# Patient Record
Sex: MALE | Race: WHITE | NOT HISPANIC OR LATINO | Employment: FULL TIME
[De-identification: names, ages, dates, MRNs, and addresses within clinical notes are randomized per-mention and may not be internally consistent; named-entity substitution may affect disease eponyms.]

---

## 2017-08-14 ENCOUNTER — RECORDS - HEALTHEAST (OUTPATIENT)
Dept: ADMINISTRATIVE | Facility: OTHER | Age: 42
End: 2017-08-14

## 2018-08-20 ENCOUNTER — RECORDS - HEALTHEAST (OUTPATIENT)
Dept: LAB | Facility: CLINIC | Age: 43
End: 2018-08-20

## 2018-08-20 LAB
ALBUMIN SERPL-MCNC: 4.6 G/DL (ref 3.5–5)
ALP SERPL-CCNC: 45 U/L (ref 45–120)
ALT SERPL W P-5'-P-CCNC: 74 U/L (ref 0–45)
AST SERPL W P-5'-P-CCNC: 75 U/L (ref 0–40)
BILIRUB DIRECT SERPL-MCNC: 0.2 MG/DL
BILIRUB SERPL-MCNC: 0.6 MG/DL (ref 0–1)
PROT SERPL-MCNC: 7.9 G/DL (ref 6–8)

## 2018-08-21 LAB — HCV AB SERPL QL IA: NEGATIVE

## 2018-08-22 LAB
HBV CORE AB SERPL QL IA: NEGATIVE
HEPATITIS B SURFACE ANTIBODY LHE- HISTORICAL: NEGATIVE

## 2022-01-01 ENCOUNTER — TELEPHONE (OUTPATIENT)
Dept: BEHAVIORAL HEALTH | Facility: CLINIC | Age: 47
End: 2022-01-01

## 2022-01-01 ENCOUNTER — HOSPITAL ENCOUNTER (EMERGENCY)
Facility: HOSPITAL | Age: 47
Discharge: PSYCHIATRIC OR SUBSTANCE USE FACILITY | End: 2022-10-14
Attending: EMERGENCY MEDICINE | Admitting: EMERGENCY MEDICINE
Payer: COMMERCIAL

## 2022-01-01 ENCOUNTER — HOSPITAL ENCOUNTER (INPATIENT)
Facility: CLINIC | Age: 47
LOS: 3 days | Discharge: HOME | DRG: 885 | End: 2022-10-17
Attending: PSYCHIATRY & NEUROLOGY | Admitting: PSYCHIATRY & NEUROLOGY
Payer: COMMERCIAL

## 2022-01-01 VITALS
HEART RATE: 69 BPM | OXYGEN SATURATION: 99 % | SYSTOLIC BLOOD PRESSURE: 131 MMHG | HEIGHT: 72 IN | DIASTOLIC BLOOD PRESSURE: 77 MMHG | BODY MASS INDEX: 27.09 KG/M2 | WEIGHT: 200 LBS | TEMPERATURE: 99.7 F | RESPIRATION RATE: 16 BRPM

## 2022-01-01 VITALS
TEMPERATURE: 97.8 F | SYSTOLIC BLOOD PRESSURE: 139 MMHG | WEIGHT: 181 LBS | DIASTOLIC BLOOD PRESSURE: 87 MMHG | HEIGHT: 72 IN | BODY MASS INDEX: 24.52 KG/M2 | HEART RATE: 99 BPM | RESPIRATION RATE: 18 BRPM | OXYGEN SATURATION: 97 %

## 2022-01-01 DIAGNOSIS — J45.20 MILD INTERMITTENT ASTHMA WITHOUT COMPLICATION: Primary | ICD-10-CM

## 2022-01-01 DIAGNOSIS — R45.851 SUICIDAL IDEATION: ICD-10-CM

## 2022-01-01 DIAGNOSIS — F32.1 MAJOR DEPRESSIVE DISORDER, SINGLE EPISODE, MODERATE (H): ICD-10-CM

## 2022-01-01 DIAGNOSIS — Z91.09 ENVIRONMENTAL ALLERGIES: ICD-10-CM

## 2022-01-01 DIAGNOSIS — F10.10 ALCOHOL ABUSE: ICD-10-CM

## 2022-01-01 DIAGNOSIS — F41.1 GAD (GENERALIZED ANXIETY DISORDER): ICD-10-CM

## 2022-01-01 LAB
ALBUMIN SERPL-MCNC: 2.6 G/DL (ref 3.4–5)
ALP SERPL-CCNC: 123 U/L (ref 40–150)
ALT SERPL W P-5'-P-CCNC: 59 U/L (ref 0–70)
AMPHETAMINES UR QL SCN: NORMAL
ANION GAP SERPL CALCULATED.3IONS-SCNC: 10 MMOL/L (ref 7–15)
ANION GAP SERPL CALCULATED.3IONS-SCNC: 7 MMOL/L (ref 3–14)
APAP SERPL-MCNC: <5 UG/ML (ref 10–30)
AST SERPL W P-5'-P-CCNC: 149 U/L (ref 0–45)
BARBITURATES UR QL SCN: NORMAL
BASOPHILS # BLD AUTO: 0 10E3/UL (ref 0–0.2)
BASOPHILS # BLD AUTO: 0.1 10E3/UL (ref 0–0.2)
BASOPHILS NFR BLD AUTO: 1 %
BASOPHILS NFR BLD AUTO: 1 %
BENZODIAZ UR QL SCN: NORMAL
BILIRUB SERPL-MCNC: 5 MG/DL (ref 0.2–1.3)
BUN SERPL-MCNC: 2.7 MG/DL (ref 6–20)
BUN SERPL-MCNC: 6 MG/DL (ref 7–30)
BZE UR QL SCN: NORMAL
CALCIUM SERPL-MCNC: 8.1 MG/DL (ref 8.6–10)
CALCIUM SERPL-MCNC: 8.9 MG/DL (ref 8.5–10.1)
CANNABINOIDS UR QL SCN: NORMAL
CHLORIDE BLD-SCNC: 107 MMOL/L (ref 94–109)
CHLORIDE SERPL-SCNC: 106 MMOL/L (ref 98–107)
CHOLEST SERPL-MCNC: 168 MG/DL
CO2 SERPL-SCNC: 24 MMOL/L (ref 20–32)
CREAT SERPL-MCNC: 0.59 MG/DL (ref 0.67–1.17)
CREAT SERPL-MCNC: 0.66 MG/DL (ref 0.66–1.25)
DEPRECATED HCO3 PLAS-SCNC: 25 MMOL/L (ref 22–29)
EOSINOPHIL # BLD AUTO: 0.1 10E3/UL (ref 0–0.7)
EOSINOPHIL # BLD AUTO: 0.2 10E3/UL (ref 0–0.7)
EOSINOPHIL NFR BLD AUTO: 2 %
EOSINOPHIL NFR BLD AUTO: 2 %
ERYTHROCYTE [DISTWIDTH] IN BLOOD BY AUTOMATED COUNT: 14.4 % (ref 10–15)
ERYTHROCYTE [DISTWIDTH] IN BLOOD BY AUTOMATED COUNT: 14.7 % (ref 10–15)
ETHANOL SERPL-MCNC: 0.33 G/DL
GFR SERPL CREATININE-BSD FRML MDRD: >90 ML/MIN/1.73M2
GFR SERPL CREATININE-BSD FRML MDRD: >90 ML/MIN/1.73M2
GLUCOSE BLD-MCNC: 85 MG/DL (ref 70–99)
GLUCOSE SERPL-MCNC: 98 MG/DL (ref 70–99)
HBA1C MFR BLD: 4.7 % (ref 0–5.6)
HCT VFR BLD AUTO: 35.7 % (ref 40–53)
HCT VFR BLD AUTO: 38.5 % (ref 40–53)
HDLC SERPL-MCNC: 20 MG/DL
HGB BLD-MCNC: 12.9 G/DL (ref 13.3–17.7)
HGB BLD-MCNC: 13.3 G/DL (ref 13.3–17.7)
HOLD SPECIMEN: NORMAL
IMM GRANULOCYTES # BLD: 0 10E3/UL
IMM GRANULOCYTES # BLD: 0 10E3/UL
IMM GRANULOCYTES NFR BLD: 0 %
IMM GRANULOCYTES NFR BLD: 0 %
LDLC SERPL CALC-MCNC: 116 MG/DL
LYMPHOCYTES # BLD AUTO: 1.6 10E3/UL (ref 0.8–5.3)
LYMPHOCYTES # BLD AUTO: 2.6 10E3/UL (ref 0.8–5.3)
LYMPHOCYTES NFR BLD AUTO: 30 %
LYMPHOCYTES NFR BLD AUTO: 32 %
MCH RBC QN AUTO: 33.9 PG (ref 26.5–33)
MCH RBC QN AUTO: 33.9 PG (ref 26.5–33)
MCHC RBC AUTO-ENTMCNC: 34.5 G/DL (ref 31.5–36.5)
MCHC RBC AUTO-ENTMCNC: 36.1 G/DL (ref 31.5–36.5)
MCV RBC AUTO: 94 FL (ref 78–100)
MCV RBC AUTO: 98 FL (ref 78–100)
MONOCYTES # BLD AUTO: 0.7 10E3/UL (ref 0–1.3)
MONOCYTES # BLD AUTO: 0.9 10E3/UL (ref 0–1.3)
MONOCYTES NFR BLD AUTO: 11 %
MONOCYTES NFR BLD AUTO: 13 %
NEUTROPHILS # BLD AUTO: 2.7 10E3/UL (ref 1.6–8.3)
NEUTROPHILS # BLD AUTO: 4.9 10E3/UL (ref 1.6–8.3)
NEUTROPHILS NFR BLD AUTO: 52 %
NEUTROPHILS NFR BLD AUTO: 56 %
NONHDLC SERPL-MCNC: 148 MG/DL
NRBC # BLD AUTO: 0 10E3/UL
NRBC # BLD AUTO: 0 10E3/UL
NRBC BLD AUTO-RTO: 0 /100
NRBC BLD AUTO-RTO: 0 /100
OPIATES UR QL SCN: NORMAL
PCP QUAL URINE (ROCHE): NORMAL
PLATELET # BLD AUTO: 64 10E3/UL (ref 150–450)
PLATELET # BLD AUTO: 91 10E3/UL (ref 150–450)
POTASSIUM BLD-SCNC: 4 MMOL/L (ref 3.4–5.3)
POTASSIUM SERPL-SCNC: 3.7 MMOL/L (ref 3.4–5.3)
PROT SERPL-MCNC: 8.4 G/DL (ref 6.8–8.8)
RBC # BLD AUTO: 3.81 10E6/UL (ref 4.4–5.9)
RBC # BLD AUTO: 3.92 10E6/UL (ref 4.4–5.9)
SALICYLATES SERPL-MCNC: <0.5 MG/DL
SARS-COV-2 RNA RESP QL NAA+PROBE: NEGATIVE
SODIUM SERPL-SCNC: 138 MMOL/L (ref 133–144)
SODIUM SERPL-SCNC: 141 MMOL/L (ref 136–145)
T4 FREE SERPL-MCNC: 1.4 NG/DL (ref 0.76–1.46)
TRIGL SERPL-MCNC: 160 MG/DL
TSH SERPL DL<=0.005 MIU/L-ACNC: 6.15 MU/L (ref 0.4–4)
WBC # BLD AUTO: 5.1 10E3/UL (ref 4–11)
WBC # BLD AUTO: 8.6 10E3/UL (ref 4–11)

## 2022-01-01 PROCEDURE — 250N000013 HC RX MED GY IP 250 OP 250 PS 637: Performed by: EMERGENCY MEDICINE

## 2022-01-01 PROCEDURE — C9803 HOPD COVID-19 SPEC COLLECT: HCPCS

## 2022-01-01 PROCEDURE — 80048 BASIC METABOLIC PNL TOTAL CA: CPT | Performed by: EMERGENCY MEDICINE

## 2022-01-01 PROCEDURE — G0177 OPPS/PHP; TRAIN & EDUC SERV: HCPCS

## 2022-01-01 PROCEDURE — 124N000002 HC R&B MH UMMC

## 2022-01-01 PROCEDURE — 99239 HOSP IP/OBS DSCHRG MGMT >30: CPT | Performed by: NURSE PRACTITIONER

## 2022-01-01 PROCEDURE — 99285 EMERGENCY DEPT VISIT HI MDM: CPT | Mod: 25

## 2022-01-01 PROCEDURE — 36415 COLL VENOUS BLD VENIPUNCTURE: CPT | Performed by: EMERGENCY MEDICINE

## 2022-01-01 PROCEDURE — 250N000013 HC RX MED GY IP 250 OP 250 PS 637: Performed by: NURSE PRACTITIONER

## 2022-01-01 PROCEDURE — 85025 COMPLETE CBC W/AUTO DIFF WBC: CPT | Performed by: NURSE PRACTITIONER

## 2022-01-01 PROCEDURE — 84443 ASSAY THYROID STIM HORMONE: CPT | Performed by: NURSE PRACTITIONER

## 2022-01-01 PROCEDURE — 80143 DRUG ASSAY ACETAMINOPHEN: CPT | Performed by: EMERGENCY MEDICINE

## 2022-01-01 PROCEDURE — 80061 LIPID PANEL: CPT | Performed by: NURSE PRACTITIONER

## 2022-01-01 PROCEDURE — 82077 ASSAY SPEC XCP UR&BREATH IA: CPT | Performed by: EMERGENCY MEDICINE

## 2022-01-01 PROCEDURE — 80179 DRUG ASSAY SALICYLATE: CPT | Performed by: EMERGENCY MEDICINE

## 2022-01-01 PROCEDURE — 36415 COLL VENOUS BLD VENIPUNCTURE: CPT | Performed by: NURSE PRACTITIONER

## 2022-01-01 PROCEDURE — 83036 HEMOGLOBIN GLYCOSYLATED A1C: CPT | Performed by: NURSE PRACTITIONER

## 2022-01-01 PROCEDURE — 85004 AUTOMATED DIFF WBC COUNT: CPT | Performed by: EMERGENCY MEDICINE

## 2022-01-01 PROCEDURE — 99223 1ST HOSP IP/OBS HIGH 75: CPT | Mod: AI | Performed by: NURSE PRACTITIONER

## 2022-01-01 PROCEDURE — 84439 ASSAY OF FREE THYROXINE: CPT | Performed by: NURSE PRACTITIONER

## 2022-01-01 PROCEDURE — U0005 INFEC AGEN DETEC AMPLI PROBE: HCPCS | Performed by: EMERGENCY MEDICINE

## 2022-01-01 PROCEDURE — 80307 DRUG TEST PRSMV CHEM ANLYZR: CPT | Performed by: STUDENT IN AN ORGANIZED HEALTH CARE EDUCATION/TRAINING PROGRAM

## 2022-01-01 PROCEDURE — 80053 COMPREHEN METABOLIC PANEL: CPT | Performed by: NURSE PRACTITIONER

## 2022-01-01 PROCEDURE — 90791 PSYCH DIAGNOSTIC EVALUATION: CPT

## 2022-01-01 RX ORDER — LEVALBUTEROL TARTRATE 45 UG/1
2 AEROSOL, METERED ORAL EVERY 6 HOURS PRN
Qty: 15 G | Refills: 1 | Status: SHIPPED | OUTPATIENT
Start: 2022-01-01 | End: 2022-01-01

## 2022-01-01 RX ORDER — FOLIC ACID 1 MG/1
1 TABLET ORAL DAILY
Status: ON HOLD
Start: 2022-01-01 | End: 2023-01-01

## 2022-01-01 RX ORDER — DISULFIRAM 250 MG/1
250 TABLET ORAL DAILY
Qty: 30 TABLET | Refills: 1 | Status: ON HOLD | OUTPATIENT
Start: 2022-01-01 | End: 2023-01-01

## 2022-01-01 RX ORDER — MIRTAZAPINE 7.5 MG/1
7.5 TABLET, FILM COATED ORAL AT BEDTIME
Status: DISCONTINUED | OUTPATIENT
Start: 2022-01-01 | End: 2022-01-01 | Stop reason: HOSPADM

## 2022-01-01 RX ORDER — ALBUTEROL SULFATE 90 UG/1
2 AEROSOL, METERED RESPIRATORY (INHALATION) EVERY 4 HOURS PRN
Status: DISCONTINUED | OUTPATIENT
Start: 2022-01-01 | End: 2022-01-01 | Stop reason: HOSPADM

## 2022-01-01 RX ORDER — HYDROXYZINE HYDROCHLORIDE 25 MG/1
25-50 TABLET, FILM COATED ORAL EVERY 4 HOURS PRN
Qty: 90 TABLET | Refills: 1 | Status: ON HOLD | OUTPATIENT
Start: 2022-01-01 | End: 2023-01-01

## 2022-01-01 RX ORDER — LEVALBUTEROL TARTRATE 45 UG/1
2 AEROSOL, METERED ORAL EVERY 6 HOURS PRN
Qty: 15 G | Refills: 1 | Status: ON HOLD | OUTPATIENT
Start: 2022-01-01 | End: 2023-01-01

## 2022-01-01 RX ORDER — AZELASTINE 1 MG/ML
1-2 SPRAY, METERED NASAL 2 TIMES DAILY PRN
Qty: 30 ML | Refills: 1 | Status: SHIPPED | OUTPATIENT
Start: 2022-01-01 | End: 2022-01-01

## 2022-01-01 RX ORDER — TRAZODONE HYDROCHLORIDE 50 MG/1
50 TABLET, FILM COATED ORAL
Status: DISCONTINUED | OUTPATIENT
Start: 2022-01-01 | End: 2022-01-01 | Stop reason: HOSPADM

## 2022-01-01 RX ORDER — ATENOLOL 50 MG/1
50 TABLET ORAL DAILY PRN
Status: DISCONTINUED | OUTPATIENT
Start: 2022-01-01 | End: 2022-01-01

## 2022-01-01 RX ORDER — AZELASTINE 1 MG/ML
1-2 SPRAY, METERED NASAL 2 TIMES DAILY PRN
Status: ON HOLD | COMMUNITY
End: 2022-01-01

## 2022-01-01 RX ORDER — AZELASTINE 1 MG/ML
1-2 SPRAY, METERED NASAL 2 TIMES DAILY PRN
Status: DISCONTINUED | OUTPATIENT
Start: 2022-01-01 | End: 2022-01-01 | Stop reason: HOSPADM

## 2022-01-01 RX ORDER — HYDROXYZINE HYDROCHLORIDE 25 MG/1
25 TABLET, FILM COATED ORAL EVERY 4 HOURS PRN
Status: DISCONTINUED | OUTPATIENT
Start: 2022-01-01 | End: 2022-01-01

## 2022-01-01 RX ORDER — TRAZODONE HYDROCHLORIDE 50 MG/1
50 TABLET, FILM COATED ORAL
Qty: 30 TABLET | Refills: 1 | Status: ON HOLD | OUTPATIENT
Start: 2022-01-01 | End: 2023-01-01

## 2022-01-01 RX ORDER — CETIRIZINE HYDROCHLORIDE 10 MG/1
10 TABLET ORAL DAILY PRN
COMMUNITY

## 2022-01-01 RX ORDER — ALBUTEROL SULFATE 90 UG/1
2 AEROSOL, METERED RESPIRATORY (INHALATION) EVERY 4 HOURS PRN
Status: ON HOLD | COMMUNITY
End: 2022-01-01

## 2022-01-01 RX ORDER — MULTIPLE VITAMINS W/ MINERALS TAB 9MG-400MCG
1 TAB ORAL DAILY
Status: DISCONTINUED | OUTPATIENT
Start: 2022-01-01 | End: 2022-01-01 | Stop reason: HOSPADM

## 2022-01-01 RX ORDER — ONDANSETRON 4 MG/1
4 TABLET, ORALLY DISINTEGRATING ORAL EVERY 6 HOURS PRN
Status: DISCONTINUED | OUTPATIENT
Start: 2022-01-01 | End: 2022-01-01 | Stop reason: HOSPADM

## 2022-01-01 RX ORDER — HYDROXYZINE HYDROCHLORIDE 25 MG/1
25-50 TABLET, FILM COATED ORAL EVERY 4 HOURS PRN
Status: DISCONTINUED | OUTPATIENT
Start: 2022-01-01 | End: 2022-01-01 | Stop reason: HOSPADM

## 2022-01-01 RX ORDER — AZELASTINE 1 MG/ML
1 SPRAY, METERED NASAL 2 TIMES DAILY
Status: DISCONTINUED | OUTPATIENT
Start: 2022-01-01 | End: 2022-01-01

## 2022-01-01 RX ORDER — DIAZEPAM 5 MG
5-20 TABLET ORAL EVERY 30 MIN PRN
Status: DISCONTINUED | OUTPATIENT
Start: 2022-01-01 | End: 2022-01-01

## 2022-01-01 RX ORDER — AMOXICILLIN 250 MG
1 CAPSULE ORAL 2 TIMES DAILY PRN
Status: DISCONTINUED | OUTPATIENT
Start: 2022-01-01 | End: 2022-01-01 | Stop reason: HOSPADM

## 2022-01-01 RX ORDER — MAGNESIUM HYDROXIDE/ALUMINUM HYDROXICE/SIMETHICONE 120; 1200; 1200 MG/30ML; MG/30ML; MG/30ML
30 SUSPENSION ORAL EVERY 4 HOURS PRN
Status: DISCONTINUED | OUTPATIENT
Start: 2022-01-01 | End: 2022-01-01 | Stop reason: HOSPADM

## 2022-01-01 RX ORDER — LANOLIN ALCOHOL/MO/W.PET/CERES
100 CREAM (GRAM) TOPICAL DAILY
Status: ON HOLD
Start: 2022-01-01 | End: 2023-01-01

## 2022-01-01 RX ORDER — LORAZEPAM 0.5 MG/1
1 TABLET ORAL EVERY 4 HOURS PRN
Status: DISCONTINUED | OUTPATIENT
Start: 2022-01-01 | End: 2022-01-01 | Stop reason: HOSPADM

## 2022-01-01 RX ORDER — ACETAMINOPHEN 325 MG/1
650 TABLET ORAL EVERY 4 HOURS PRN
Status: DISCONTINUED | OUTPATIENT
Start: 2022-01-01 | End: 2022-01-01 | Stop reason: HOSPADM

## 2022-01-01 RX ORDER — AZELASTINE 1 MG/ML
1-2 SPRAY, METERED NASAL 2 TIMES DAILY PRN
Qty: 30 ML | Refills: 1 | Status: SHIPPED | OUTPATIENT
Start: 2022-01-01

## 2022-01-01 RX ORDER — CETIRIZINE HYDROCHLORIDE 10 MG/1
10 TABLET ORAL DAILY PRN
Status: DISCONTINUED | OUTPATIENT
Start: 2022-01-01 | End: 2022-01-01 | Stop reason: HOSPADM

## 2022-01-01 RX ORDER — LOPERAMIDE HCL 2 MG
2 CAPSULE ORAL 4 TIMES DAILY PRN
Status: DISCONTINUED | OUTPATIENT
Start: 2022-01-01 | End: 2022-01-01 | Stop reason: HOSPADM

## 2022-01-01 RX ORDER — MULTIPLE VITAMINS W/ MINERALS TAB 9MG-400MCG
1 TAB ORAL DAILY
Status: ON HOLD
Start: 2022-01-01 | End: 2023-01-01

## 2022-01-01 RX ORDER — MIRTAZAPINE 15 MG/1
15 TABLET, FILM COATED ORAL AT BEDTIME
Qty: 30 TABLET | Refills: 1 | Status: ON HOLD | OUTPATIENT
Start: 2022-01-01 | End: 2023-01-01

## 2022-01-01 RX ORDER — FOLIC ACID 1 MG/1
1 TABLET ORAL DAILY
Status: DISCONTINUED | OUTPATIENT
Start: 2022-01-01 | End: 2022-01-01 | Stop reason: HOSPADM

## 2022-01-01 RX ADMIN — AZELASTINE HYDROCHLORIDE 2 SPRAY: 137 SPRAY, METERED NASAL at 09:04

## 2022-01-01 RX ADMIN — MULTIPLE VITAMINS W/ MINERALS TAB 1 TABLET: TAB at 09:51

## 2022-01-01 RX ADMIN — FOLIC ACID 1 MG: 1 TABLET ORAL at 08:45

## 2022-01-01 RX ADMIN — LORAZEPAM 1 MG: 0.5 TABLET ORAL at 21:59

## 2022-01-01 RX ADMIN — MULTIPLE VITAMINS W/ MINERALS TAB 1 TABLET: TAB at 13:59

## 2022-01-01 RX ADMIN — HYDROXYZINE HYDROCHLORIDE 50 MG: 25 TABLET, FILM COATED ORAL at 13:59

## 2022-01-01 RX ADMIN — AZELASTINE HYDROCHLORIDE 2 SPRAY: 137 SPRAY, METERED NASAL at 09:52

## 2022-01-01 RX ADMIN — TRAZODONE HYDROCHLORIDE 50 MG: 50 TABLET ORAL at 02:47

## 2022-01-01 RX ADMIN — AZELASTINE HYDROCHLORIDE 2 SPRAY: 137 SPRAY, METERED NASAL at 22:39

## 2022-01-01 RX ADMIN — MIRTAZAPINE 7.5 MG: 7.5 TABLET, FILM COATED ORAL at 22:06

## 2022-01-01 RX ADMIN — MULTIPLE VITAMINS W/ MINERALS TAB 1 TABLET: TAB at 08:45

## 2022-01-01 RX ADMIN — THIAMINE HCL TAB 100 MG 100 MG: 100 TAB at 08:57

## 2022-01-01 RX ADMIN — MIRTAZAPINE 7.5 MG: 7.5 TABLET, FILM COATED ORAL at 23:18

## 2022-01-01 RX ADMIN — AZELASTINE HYDROCHLORIDE 1 SPRAY: 137 SPRAY, METERED NASAL at 21:08

## 2022-01-01 RX ADMIN — FLUTICASONE FUROATE AND VILANTEROL TRIFENATATE 1 PUFF: 200; 25 POWDER RESPIRATORY (INHALATION) at 08:58

## 2022-01-01 RX ADMIN — THIAMINE HCL TAB 100 MG 100 MG: 100 TAB at 08:44

## 2022-01-01 RX ADMIN — FOLIC ACID 1 MG: 1 TABLET ORAL at 08:57

## 2022-01-01 RX ADMIN — FLUTICASONE FUROATE AND VILANTEROL TRIFENATATE 1 PUFF: 200; 25 POWDER RESPIRATORY (INHALATION) at 09:51

## 2022-01-01 RX ADMIN — HYDROXYZINE HYDROCHLORIDE 50 MG: 25 TABLET, FILM COATED ORAL at 22:05

## 2022-01-01 RX ADMIN — FOLIC ACID 1 MG: 1 TABLET ORAL at 13:59

## 2022-01-01 RX ADMIN — FOLIC ACID 1 MG: 1 TABLET ORAL at 09:51

## 2022-01-01 RX ADMIN — THIAMINE HCL TAB 100 MG 100 MG: 100 TAB at 13:59

## 2022-01-01 RX ADMIN — MIRTAZAPINE 7.5 MG: 7.5 TABLET, FILM COATED ORAL at 21:42

## 2022-01-01 RX ADMIN — FLUTICASONE FUROATE AND VILANTEROL TRIFENATATE 1 PUFF: 200; 25 POWDER RESPIRATORY (INHALATION) at 10:12

## 2022-01-01 RX ADMIN — AZELASTINE HYDROCHLORIDE 1 SPRAY: 137 SPRAY, METERED NASAL at 08:45

## 2022-01-01 RX ADMIN — HYDROXYZINE HYDROCHLORIDE 50 MG: 25 TABLET, FILM COATED ORAL at 17:15

## 2022-01-01 RX ADMIN — MULTIPLE VITAMINS W/ MINERALS TAB 1 TABLET: TAB at 08:57

## 2022-01-01 RX ADMIN — HYDROXYZINE HYDROCHLORIDE 50 MG: 25 TABLET, FILM COATED ORAL at 19:37

## 2022-01-01 RX ADMIN — THIAMINE HCL TAB 100 MG 100 MG: 100 TAB at 09:51

## 2022-01-01 ASSESSMENT — ACTIVITIES OF DAILY LIVING (ADL)
DRESS: INDEPENDENT
ADLS_ACUITY_SCORE: 35
LAUNDRY: WITH SUPERVISION
ADLS_ACUITY_SCORE: 28
DRESSING/BATHING_DIFFICULTY: NO
ADLS_ACUITY_SCORE: 28
TOILETING_ISSUES: NO
ADLS_ACUITY_SCORE: 28
NUMBER_OF_TIMES_PATIENT_HAS_FALLEN_WITHIN_LAST_SIX_MONTHS: 1
ORAL_HYGIENE: INDEPENDENT
ADLS_ACUITY_SCORE: 28
ADLS_ACUITY_SCORE: 28
ADLS_ACUITY_SCORE: 35
ADLS_ACUITY_SCORE: 28
LAUNDRY: WITH SUPERVISION
DIFFICULTY_EATING/SWALLOWING: NO
ADLS_ACUITY_SCORE: 28
LAUNDRY: WITH SUPERVISION
ADLS_ACUITY_SCORE: 28
ADLS_ACUITY_SCORE: 35
HYGIENE/GROOMING: INDEPENDENT
ADLS_ACUITY_SCORE: 28
WEAR_GLASSES_OR_BLIND: NO
ADLS_ACUITY_SCORE: 35
ADLS_ACUITY_SCORE: 28
ADLS_ACUITY_SCORE: 28
ADLS_ACUITY_SCORE: 35
ADLS_ACUITY_SCORE: 28
HEARING_DIFFICULTY_OR_DEAF: NO
ADLS_ACUITY_SCORE: 35
ADLS_ACUITY_SCORE: 28
ADLS_ACUITY_SCORE: 35
ADLS_ACUITY_SCORE: 28
HYGIENE/GROOMING: INDEPENDENT
CONCENTRATING,_REMEMBERING_OR_MAKING_DECISIONS_DIFFICULTY: NO
DIFFICULTY_COMMUNICATING: NO
ADLS_ACUITY_SCORE: 28
ADLS_ACUITY_SCORE: 28
CHANGE_IN_FUNCTIONAL_STATUS_SINCE_ONSET_OF_CURRENT_ILLNESS/INJURY: NO
HYGIENE/GROOMING: INDEPENDENT
ADLS_ACUITY_SCORE: 28
ADLS_ACUITY_SCORE: 35
ADLS_ACUITY_SCORE: 28
ADLS_ACUITY_SCORE: 28
ADLS_ACUITY_SCORE: 35
DOING_ERRANDS_INDEPENDENTLY_DIFFICULTY: NO
ADLS_ACUITY_SCORE: 28
ADLS_ACUITY_SCORE: 28
ORAL_HYGIENE: INDEPENDENT
ADLS_ACUITY_SCORE: 35
ADLS_ACUITY_SCORE: 35
WALKING_OR_CLIMBING_STAIRS_DIFFICULTY: NO
DRESS: INDEPENDENT
ORAL_HYGIENE: INDEPENDENT
ADLS_ACUITY_SCORE: 35
ADLS_ACUITY_SCORE: 35
ADLS_ACUITY_SCORE: 28
ADLS_ACUITY_SCORE: 28
LAUNDRY: WITH SUPERVISION
ADLS_ACUITY_SCORE: 28
ADLS_ACUITY_SCORE: 35
ADLS_ACUITY_SCORE: 28
FALL_HISTORY_WITHIN_LAST_SIX_MONTHS: NO
ADLS_ACUITY_SCORE: 28
DRESS: INDEPENDENT
HYGIENE/GROOMING: INDEPENDENT
ADLS_ACUITY_SCORE: 35
DRESS: INDEPENDENT
ADLS_ACUITY_SCORE: 35
ADLS_ACUITY_SCORE: 45
ORAL_HYGIENE: INDEPENDENT

## 2022-10-13 NOTE — ED NOTES
Pt refuse dinner. States he will not have dinner tonight. He will just have the water and juice that he currently have right now.
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
## 2022-10-13 NOTE — PROGRESS NOTES
Pt returned from DEC assessment very teary eyed.Stated he knows no one cares.  Pt agreed to prayer.  Prayers and verbal support provided.  Pt very appreciative.

## 2022-10-13 NOTE — ED TRIAGE NOTES
Pt reports having mental health issues for the past year and broken relationships. Pt reports that he has decided to end his life over the next 72 hours. Pt's plan is to jump off of a bridge. Pt is here with his daughter.
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
## 2022-10-13 NOTE — ED NOTES
"[facility] ED Mental Health Handoff Note:     Assuming care from: Dr Brittanie Dai    Brief HPI: 46 year old male signed out to me by the above provider. See initial ED Provider note for full details of the presentation.   In brief, patient  presents with plans for killing himself. Her arrived to ED with daughter. Patient states that he has been trying to find a way to kill himself for the last year but had never come up with a good plan. He states that he now knows of a bridge in [place] and plans to kill himself by jumping off this bridge. Patient states that if he goes home he is going to kill himself within the next 72 hours. Patient states that his wife knows that he has been struggling with his mental health, but states that she \"doesn't take it seriously.\" States that he tried talking to her about it again today, but she did not take it seriously     Home meds reviewed and ordered/administered: Yes  Medically stable for inpatient mental health admission: Yes.  Evaluated by mental health: Yes. The recommendation is for inpatient mental health treatment. Bed search in process  Safety concerns: At the time I received sign out, there were no safety concerns.    Hold Status:  Active Orders   N/A            Labs/Imaging:   Results for orders placed or performed during the hospital encounter of 10/13/22 (from the past 24 hour(s))   Urine Drugs of Abuse Screen Panel 1+ - Drug Screen plus Methadone *Canceled*     Status: None ()    Collection Time: 10/13/22  4:37 AM    Narrative    The following orders were created for panel order Urine Drugs of Abuse Screen Panel 1+ - Drug Screen plus Methadone.  Procedure                               Abnormality         Status                     ---------                               -----------         ------                       Please view results for these tests on the individual orders.   CBC with platelets differential     Status: Abnormal    Collection Time: " 10/13/22  4:43 AM    Narrative    The following orders were created for panel order CBC with platelets differential.  Procedure                               Abnormality         Status                     ---------                               -----------         ------                     CBC with platelets and d...[[id number]]  Abnormal            Final result                 Please view results for these tests on the individual orders.   Basic metabolic panel     Status: Abnormal    Collection Time: 10/13/22  4:43 AM   Result Value Ref Range    Sodium 141 136 - 145 mmol/L    Potassium 3.7 3.4 - 5.3 mmol/L    Chloride 106 98 - 107 mmol/L    Carbon Dioxide (CO2) 25 22 - 29 mmol/L    Anion Gap 10 7 - 15 mmol/L    Urea Nitrogen 2.7 (L) 6.0 - 20.0 mg/dL    Creatinine 0.59 (L) 0.67 - 1.17 mg/dL    Calcium 8.1 (L) 8.6 - 10.0 mg/dL    Glucose 98 70 - 99 mg/dL    GFR Estimate >90 >60 mL/min/1.73m2   Ethyl Alcohol Level     Status: Abnormal    Collection Time: 10/13/22  4:43 AM   Result Value Ref Range    Alcohol ethyl 0.33 (HH) <=0.00 g/dL   [place] Draw     Status: None    Collection Time: 10/13/22  4:43 AM    Narrative    The following orders were created for panel order [place] Draw.  Procedure                               Abnormality         Status                     ---------                               -----------         ------                     Extra Red Top Tube[[id number]]                               Final result               Extra Green Top (Lithium...[[id number]]                      Final result               Extra Purple Top Tube[[id number]]                            Final result                 Please view results for these tests on the individual orders.   Acetaminophen level     Status: Abnormal    Collection Time: 10/13/22  4:43 AM   Result Value Ref Range    Acetaminophen <5.0 (L) 10.0 - 30.0 ug/mL   Salicylate level     Status: Normal    Collection Time: 10/13/22  4:43 AM   Result Value Ref  Range    Salicylate <0.5   mg/dL   CBC with platelets and differential     Status: Abnormal    Collection Time: 10/13/22  4:43 AM   Result Value Ref Range    WBC Count 8.6 4.0 - 11.0 10e3/uL    RBC Count 3.92 (L) 4.40 - 5.90 10e6/uL    Hemoglobin 13.3 13.3 - 17.7 g/dL    Hematocrit 38.5 (L) 40.0 - 53.0 %    MCV 98 78 - 100 fL    MCH 33.9 (H) 26.5 - 33.0 pg    MCHC 34.5 31.5 - 36.5 g/dL    RDW 14.7 10.0 - 15.0 %    Platelet Count 91 (L) 150 - 450 10e3/uL    % Neutrophils 56 %    % Lymphocytes 30 %    % Monocytes 11 %    % Eosinophils 2 %    % Basophils 1 %    % Immature Granulocytes 0 %    NRBCs per 100 WBC 0 <1 /100    Absolute Neutrophils 4.9 1.6 - 8.3 10e3/uL    Absolute Lymphocytes 2.6 0.8 - 5.3 10e3/uL    Absolute Monocytes 0.9 0.0 - 1.3 10e3/uL    Absolute Eosinophils 0.2 0.0 - 0.7 10e3/uL    Absolute Basophils 0.1 0.0 - 0.2 10e3/uL    Absolute Immature Granulocytes 0.0 <=0.4 10e3/uL    Absolute NRBCs 0.0 10e3/uL   Extra Red Top Tube     Status: None    Collection Time: 10/13/22  4:43 AM   Result Value Ref Range    Hold Specimen JIC    Extra Green Top (Lithium Heparin) Tube     Status: None    Collection Time: 10/13/22  4:43 AM   Result Value Ref Range    Hold Specimen JIC    Extra Purple Top Tube     Status: None    Collection Time: 10/13/22  4:43 AM   Result Value Ref Range    Hold Specimen JIC    Asymptomatic COVID-19 Virus (Coronavirus) by PCR Nasopharyngeal     Status: Normal    Collection Time: 10/13/22  4:44 AM    Specimen: Nasopharyngeal; Swab   Result Value Ref Range    SARS CoV2 PCR Negative Negative    Narrative    Testing was performed using the Xpert Xpress SARS-CoV-2 Assay on the   Cepheid Gene-Xpert Instrument Systems. Additional information about   this Emergency Use Authorization (EUA) assay can be found via the Lab   Guide. This test should be ordered for the detection of SARS-CoV-2 in   individuals who meet SARS-CoV-2 clinical and/or epidemiological   criteria. Test performance is unknown in  asymptomatic patients. This   test is for in vitro diagnostic use under the FDA EUA for   laboratories certified under CLIA to perform high complexity testing.   This test has not been FDA cleared or approved. A negative result   does not rule out the presence of PCR inhibitors in the specimen or   target RNA in concentration below the limit of detection for the   assay. The possibility of a false negative should be considered if   the patient's recent exposure or clinical presentation suggests   COVID-19. This test was validated by the [facility]. This laboratory is certified under the Clinical Laboratory Improvement Amendments of 1988 (CLIA-88) as qualified to perform high complexity laboratory testing.     Urine Drugs of Abuse Screen Panel 1+ - Drug Screen plus Methadone *Canceled*     Status: None ()    Collection Time: 10/13/22  8:19 AM    Narrative    The following orders were created for panel order Urine Drugs of Abuse Screen Panel 1+ - Drug Screen plus Methadone.  Procedure                               Abnormality         Status                     ---------                               -----------         ------                     Drugs of Abuse 1+ Panel,...[[id number]]                                                   Please view results for these tests on the individual orders.   Drug abuse screen 77 urine (FL, RH, SH)     Status: Normal    Collection Time: 10/13/22  8:19 AM   Result Value Ref Range    Amphetamines Urine Screen Negative Screen Negative    Barbituates Urine Screen Negative Screen Negative    Benzodiazepine Urine Screen Negative Screen Negative    Cannabinoids Urine Screen Negative Screen Negative    Opiates Urine Screen Negative Screen Negative    PCP Urine Screen Negative Screen Negative    Cocaine Urine Screen Negative Screen Negative         ED Meds:  Medications   LORazepam (ATIVAN) tablet 1 mg (has no administration in time range)     No orders  to display       ED Course:  ED Course as of 10/13/22 1510   u Oct 13, 2022   0704 Sign out received: 45 y/o M who presents with suicidal ideation and etoh intoxication. Waiting DEC assessment. Is holdable.   1040 DEC met with patient plan for admission. Voluntary but holdable.   1506 Patient signed out to Dr. Garcia.       There were no significant events during my shift.    Patient was signed out to the oncoming provider, Dr. Melchor at shift change    Impression:    ICD-10-CM    1. Suicidal ideation  R45.851           Plan:    1. Awaiting inpatient mental health admission/transfer.    Willie Garcia MD  [facility] EMERGENCY DEPARTMENT  [employer] [facility] [id number] [phone]                   Willie Garcia MD  10/13/22 0589

## 2022-10-13 NOTE — ED NOTES
"[facility] ED Mental Health Handoff Note:     Assuming care from: Dr Chalo Cedeño    Brief HPI: 46 year old male signed out to me by the above provider. See initial ED Provider note for full details of the presentation.   In brief, patient presents with plans for killing himself. Her arrived to ED with daughter. Patient states that he has been trying to find a way to kill himself for the last year but had never come up with a good plan. He states that he now knows of a bridge in [place] and plans to kill himself by jumping off this bridge. Patient states that if he goes home he is going to kill himself within the next 72 hours. Patient states that his wife knows that he has been struggling with his mental health, but states that she \"doesn't take it seriously.\" States that he tried talking to her about it again today, but she did not take it seriously    Home meds reviewed and ordered/administered: No  Medically stable for inpatient mental health admission: Yes.  Evaluated by mental health: Yes. The recommendation is for inpatient mental health treatment. Bed search in process  Safety concerns: At the time I received sign out, there were no safety concerns.    Hold Status:  Active Orders   N/A       Labs/Imaging:   Results for orders placed or performed during the hospital encounter of 10/13/22 (from the past 24 hour(s))   CBC with platelets differential     Status: Abnormal    Collection Time: 10/13/22  4:43 AM    Narrative    The following orders were created for panel order CBC with platelets differential.  Procedure                               Abnormality         Status                     ---------                               -----------         ------                     CBC with platelets and d...[[id number]]  Abnormal            Final result                 Please view results for these tests on the individual orders.   Basic metabolic panel     Status: Abnormal    Collection Time: 10/13/22  4:43 " AM   Result Value Ref Range    Sodium 141 136 - 145 mmol/L    Potassium 3.7 3.4 - 5.3 mmol/L    Chloride 106 98 - 107 mmol/L    Carbon Dioxide (CO2) 25 22 - 29 mmol/L    Anion Gap 10 7 - 15 mmol/L    Urea Nitrogen 2.7 (L) 6.0 - 20.0 mg/dL    Creatinine 0.59 (L) 0.67 - 1.17 mg/dL    Calcium 8.1 (L) 8.6 - 10.0 mg/dL    Glucose 98 70 - 99 mg/dL    GFR Estimate >90 >60 mL/min/1.73m2   Ethyl Alcohol Level     Status: Abnormal    Collection Time: 10/13/22  4:43 AM   Result Value Ref Range    Alcohol ethyl 0.33 (HH) <=0.00 g/dL   Dinosaur Draw     Status: None    Collection Time: 10/13/22  4:43 AM    Narrative    The following orders were created for panel order Dinosaur Draw.  Procedure                               Abnormality         Status                     ---------                               -----------         ------                     Extra Red Top Tube[[id number]]                               Final result               Extra Green Top (Lithium...[[id number]]                      Final result               Extra Purple Top Tube[[id number]]                            Final result                 Please view results for these tests on the individual orders.   Acetaminophen level     Status: Abnormal    Collection Time: 10/13/22  4:43 AM   Result Value Ref Range    Acetaminophen <5.0 (L) 10.0 - 30.0 ug/mL   Salicylate level     Status: Normal    Collection Time: 10/13/22  4:43 AM   Result Value Ref Range    Salicylate <0.5   mg/dL   CBC with platelets and differential     Status: Abnormal    Collection Time: 10/13/22  4:43 AM   Result Value Ref Range    WBC Count 8.6 4.0 - 11.0 10e3/uL    RBC Count 3.92 (L) 4.40 - 5.90 10e6/uL    Hemoglobin 13.3 13.3 - 17.7 g/dL    Hematocrit 38.5 (L) 40.0 - 53.0 %    MCV 98 78 - 100 fL    MCH 33.9 (H) 26.5 - 33.0 pg    MCHC 34.5 31.5 - 36.5 g/dL    RDW 14.7 10.0 - 15.0 %    Platelet Count 91 (L) 150 - 450 10e3/uL    % Neutrophils 56 %    % Lymphocytes 30 %    % Monocytes 11 %    %  Eosinophils 2 %    % Basophils 1 %    % Immature Granulocytes 0 %    NRBCs per 100 WBC 0 <1 /100    Absolute Neutrophils 4.9 1.6 - 8.3 10e3/uL    Absolute Lymphocytes 2.6 0.8 - 5.3 10e3/uL    Absolute Monocytes 0.9 0.0 - 1.3 10e3/uL    Absolute Eosinophils 0.2 0.0 - 0.7 10e3/uL    Absolute Basophils 0.1 0.0 - 0.2 10e3/uL    Absolute Immature Granulocytes 0.0 <=0.4 10e3/uL    Absolute NRBCs 0.0 10e3/uL   Extra Red Top Tube     Status: None    Collection Time: 10/13/22  4:43 AM   Result Value Ref Range    Hold Specimen JIC    Extra Green Top (Lithium Heparin) Tube     Status: None    Collection Time: 10/13/22  4:43 AM   Result Value Ref Range    Hold Specimen JIC    Extra Purple Top Tube     Status: None    Collection Time: 10/13/22  4:43 AM   Result Value Ref Range    Hold Specimen JIC    Asymptomatic COVID-19 Virus (Coronavirus) by PCR Nasopharyngeal     Status: Normal    Collection Time: 10/13/22  4:44 AM    Specimen: Nasopharyngeal; Swab   Result Value Ref Range    SARS CoV2 PCR Negative Negative    Narrative    Testing was performed using the Xpert Xpress SARS-CoV-2 Assay on the   Cepheid Gene-Xpert Instrument Systems. Additional information about   this Emergency Use Authorization (EUA) assay can be found via the Lab   Guide. This test should be ordered for the detection of SARS-CoV-2 in   individuals who meet SARS-CoV-2 clinical and/or epidemiological   criteria. Test performance is unknown in asymptomatic patients. This   test is for in vitro diagnostic use under the FDA EUA for   laboratories certified under CLIA to perform high complexity testing.   This test has not been FDA cleared or approved. A negative result   does not rule out the presence of PCR inhibitors in the specimen or   target RNA in concentration below the limit of detection for the   assay. The possibility of a false negative should be considered if   the patient's recent exposure or clinical presentation suggests   COVID-19. This test was  validated by the [facility]. This laboratory is certified under the Clinical Laboratory Improvement Amendments of 1988 (CLIA-88) as qualified to perform high complexity laboratory testing.           ED Meds:  Medications   LORazepam (ATIVAN) tablet 1 mg (has no administration in time range)     No orders to display     ED Course as of 10/13/22 1506   u Oct 13, 2022   0704 Sign out received: 45 y/o M who presents with suicidal ideation and etoh intoxication. Waiting DEC assessment. Is holdable.   1040 DEC met with patient plan for admission. Voluntary but holdable.   1506 Patient signed out to Dr. Garcia.     There were no significant events during my shift.    Patient was signed out to the oncoming provider, Dr. Nicolas Garcia at shift change pending mental health admission.    Impression:    ICD-10-CM    1. Suicidal ideation  R45.851           Plan:    1. Awaiting inpatient mental health admission/transfer.    Brittanie Dai MD  [facility] EMERGENCY DEPARTMENT  [address] [place] [id number] [phone]                   Brittanie Dai MD  10/13/22 1507

## 2022-10-13 NOTE — PHARMACY-ADMISSION MEDICATION HISTORY
Pharmacy Note - Admission Medication History    Pertinent Provider Information: No PTA medications      ______________________________________________________________________    Prior To Admission (PTA) med list completed and updated in EMR.       No outpatient medications have been marked as taking for the 10/13/22 encounter (Hospital Encounter).       Information source(s): Patient and CareEverywhere/SureScripts  Method of interview communication: in-person    Summary of Changes to PTA Med List  New: -  Discontinued: lorazepam   Changed: -    Patient was asked about OTC/herbal products specifically.  PTA med list reflects this.    Allergies were reviewed, assessed, and updated with the patient.      The information provided in this note is only as accurate as the sources available at the time of the update(s).    Thank you for the opportunity to participate in the care of this patient.    BARBER ANDREWS RPH  10/13/2022 1:42 PM

## 2022-10-13 NOTE — ED NOTES
Pt sitting in the waiting room while a room can be found. RN Cassandra watching the patient to insure his safety. Pt is resting calmly and filling out paperwork that was given to him by registration. Family member/friend is with the patient.

## 2022-10-13 NOTE — ED PROVIDER NOTES
EMERGENCY DEPARTMENT ENCOUNTER       ED Course & Medical Decision Making   4:56 AM I met with the patient, obtained history, performed an initial exam, and discussed options and plan for diagnostics and treatment here in the ED.    I saw and examined the patient.  He will be placed in a safe room.  He has no active medical complaints.  He states that he is actively suicidal and that he has a plan to jump off of a bridge.  He is here voluntarily.  Labs have been ordered for placement.  He is awaiting DEC assessments but I do feel that he requires inpatient mental health services based on my assessment at this time.            Prior to making a final disposition on this patient the results of patient's tests and other diagnostic studies were discussed with the patient. All questions were answered. Patient expressed understanding of the plan and was amenable to it.    Medications - No data to display    Final Impression     1. Suicidal ideation        Chief Complaint     Chief Complaint   Patient presents with     Suicidal       Pt reports having mental health issues for the past year and broken relationships. Pt reports that he has decided to end his life over the next 72 hours. Pt's plan is to jump off of a bridge. Pt is here with his daughter.    HPI     Osmani Salazar is a 46 year old male who presents for evaluation of suicidal ideation with a plan. Patient presents voluntarily with his daughter after he called her this evening telling her that he was planning on killing himself. Daughter then brought patient into the ED. Patient states that he has been trying to find a way to kill himself for the last year but had never come up with a good plan. He states that he now knows of a bridge in [place] and plans to kill himself by jumping off this bridge. Patient states that if he goes home he is going to kill himself within the next 72 hours. Patient states that his wife knows that he has been struggling with his mental  "health, but states that she \"doesn't take it seriously.\" States that he tried talking to her about it again today, but she did not take it seriously, which prompted him to call his daughter, who then brought patient here for evaluation.    Patient reports that he has not spoken to a crisis worker in the past. Reports that he has been on Citalopram in the past but had side effects of nausea and vomiting, so he is no longer taking it. Reports he has never tried Ativan.    Patient otherwise denies any medical complaints or concerns.    I, Justice Brennanlund am serving as a scribe to document services personally performed by Chalo Cedeño M.D. based on my observation and the provider's statements to me. I, Chalo Cedeño M.D attest that Justice Vik is acting in a scribe capacity, has observed my performance of the services and has documented them in accordance with my direction.    Past Medical History     No past medical history on file.  No past surgical history on file.  No family history on file.        Relevant past medical, surgical, family and social history as documented above, has been reviewed and discussed with patient. No changes or additions, unless otherwise noted in the HPI.    Current Medications     LORazepam (ATIVAN) 0.5 MG tablet  LORazepam (ATIVAN) 1 MG tablet        Allergies     No Known Allergies    Review of Systems     Review of Systems   Psychiatric/Behavioral: Positive for suicidal ideas (with plan to jump off bridge).   All other systems reviewed and are negative.       Remainder of systems reviewed, unless noted in HPI all others negative.    Physical Exam     BP (!) 160/84   Pulse 84   Temp 98.3  F (36.8  C) (Oral)   Resp 16   Ht 1.829 m (6')   Wt 90.7 kg (200 lb)   SpO2 100%   BMI 27.12 kg/m      Physical Exam  Vitals and nursing note reviewed.   Constitutional:       General: He is not in acute distress.  HENT:      Head: Normocephalic.      Nose: Nose normal.   Eyes:      General: " No scleral icterus.  Cardiovascular:      Rate and Rhythm: Normal rate.   Pulmonary:      Effort: Pulmonary effort is normal.   Musculoskeletal:      Cervical back: Neck supple.   Skin:     Findings: No rash.   Neurological:      Mental Status: He is alert. Mental status is at baseline.   Psychiatric:         Mood and Affect: Mood is anxious.         Thought Content: Thought content includes suicidal ideation. Thought content does not include homicidal ideation. Thought content includes suicidal plan. Thought content does not include homicidal plan.         Judgment: Judgment is impulsive and inappropriate.         Labs & Imaging     Labs Ordered and Resulted from Time of ED Arrival to Time of ED Departure   BASIC METABOLIC PANEL - Abnormal       Result Value    Sodium 141      Potassium 3.7      Chloride 106      Carbon Dioxide (CO2) 25      Anion Gap 10      Urea Nitrogen 2.7 (*)     Creatinine 0.59 (*)     Calcium 8.1 (*)     Glucose 98      GFR Estimate >90     ETHYL ALCOHOL LEVEL - Abnormal    Alcohol ethyl 0.33 (*)    ACETAMINOPHEN LEVEL - Abnormal    Acetaminophen <5.0 (*)    CBC WITH PLATELETS AND DIFFERENTIAL - Abnormal    WBC Count 8.6      RBC Count 3.92 (*)     Hemoglobin 13.3      Hematocrit 38.5 (*)     MCV 98      MCH 33.9 (*)     MCHC 34.5      RDW 14.7      Platelet Count 91 (*)     % Neutrophils 56      % Lymphocytes 30      % Monocytes 11      % Eosinophils 2      % Basophils 1      % Immature Granulocytes 0      NRBCs per 100 WBC 0      Absolute Neutrophils 4.9      Absolute Lymphocytes 2.6      Absolute Monocytes 0.9      Absolute Eosinophils 0.2      Absolute Basophils 0.1      Absolute Immature Granulocytes 0.0      Absolute NRBCs 0.0     SALICYLATE LEVEL - Normal    Salicylate <0.5     COVID-19 VIRUS (CORONAVIRUS) BY PCR - Normal    SARS CoV2 PCR Negative           Results for orders placed or performed during the hospital encounter of 10/13/22   Basic metabolic panel   Result Value Ref Range     Sodium 141 136 - 145 mmol/L    Potassium 3.7 3.4 - 5.3 mmol/L    Chloride 106 98 - 107 mmol/L    Carbon Dioxide (CO2) 25 22 - 29 mmol/L    Anion Gap 10 7 - 15 mmol/L    Urea Nitrogen 2.7 (L) 6.0 - 20.0 mg/dL    Creatinine 0.59 (L) 0.67 - 1.17 mg/dL    Calcium 8.1 (L) 8.6 - 10.0 mg/dL    Glucose 98 70 - 99 mg/dL    GFR Estimate >90 >60 mL/min/1.73m2   Ethyl Alcohol Level   Result Value Ref Range    Alcohol ethyl 0.33 (HH) <=0.00 g/dL   Acetaminophen level   Result Value Ref Range    Acetaminophen <5.0 (L) 10.0 - 30.0 ug/mL   Salicylate level   Result Value Ref Range    Salicylate <0.5   mg/dL   Asymptomatic COVID-19 Virus (Coronavirus) by PCR Nasopharyngeal    Specimen: Nasopharyngeal; Swab   Result Value Ref Range    SARS CoV2 PCR Negative Negative   CBC with platelets and differential   Result Value Ref Range    WBC Count 8.6 4.0 - 11.0 10e3/uL    RBC Count 3.92 (L) 4.40 - 5.90 10e6/uL    Hemoglobin 13.3 13.3 - 17.7 g/dL    Hematocrit 38.5 (L) 40.0 - 53.0 %    MCV 98 78 - 100 fL    MCH 33.9 (H) 26.5 - 33.0 pg    MCHC 34.5 31.5 - 36.5 g/dL    RDW 14.7 10.0 - 15.0 %    Platelet Count 91 (L) 150 - 450 10e3/uL    % Neutrophils 56 %    % Lymphocytes 30 %    % Monocytes 11 %    % Eosinophils 2 %    % Basophils 1 %    % Immature Granulocytes 0 %    NRBCs per 100 WBC 0 <1 /100    Absolute Neutrophils 4.9 1.6 - 8.3 10e3/uL    Absolute Lymphocytes 2.6 0.8 - 5.3 10e3/uL    Absolute Monocytes 0.9 0.0 - 1.3 10e3/uL    Absolute Eosinophils 0.2 0.0 - 0.7 10e3/uL    Absolute Basophils 0.1 0.0 - 0.2 10e3/uL    Absolute Immature Granulocytes 0.0 <=0.4 10e3/uL    Absolute NRBCs 0.0 10e3/uL   Extra Red Top Tube   Result Value Ref Range    Hold Specimen JIC    Extra Green Top (Lithium Heparin) Tube   Result Value Ref Range    Hold Specimen JIC    Extra Purple Top Tube   Result Value Ref Range    Hold Specimen JIC        Chalo Cedeño MD  Emergency Medicine  [facility] EMERGENCY DEPARTMENT  [address]  [facility] [id number]  [phone]  10/13/2022     Chalo Cedeño MD  10/13/22 0651

## 2022-10-13 NOTE — PROGRESS NOTES
"Pt states he has battles ETOH abuse for many years.  Quit twice before on his own and attended AA a couple of times. Each time his withdrawal symptoms were tremors and diaphoresis only.    Pt admits to approx. 20 mixed/beers daily at this time.  Voices concern over withdrawing .  Pt also smokes cigarettes and states he can go a long time without and not have cravings or withdrawal symptoms. Pt denies any other  Drug use.  States he has done thousands of hours of consoling with his wife and daughters all for his daughters.  He denies ever having any therapy  Or treatment for himself or alcohol use.  Pt admits to wanting to die for the last approx year and came up with a definitive plan 2 weeks ago but after arguing with his wife yesterday and telling her he had a plan ( he states she did not care about it) he told his daughter and she brought him in.  Pt states if he leaves here he will follow through asap.  Pt states he finds no anita in anything any more \"not eating, drinking, trees and that stuff\".  Pt works as a .  Pt did make verbal contract to not self harm while he is in our care.    "

## 2022-10-14 NOTE — PLAN OF CARE
10/14/22 1500   General Information   Has Not Attended OT as of: 10/14/22     Pt has not attended scheduled occupational therapy sessions. Encourage attendance and participation. Pt will be given self-assessment form, and OT staff will explain the purpose of including them in their treatment plan and offer options for meeting their needs.

## 2022-10-14 NOTE — ED NOTES
[facility] ED Handoff Report    ED Chief Complaint: Suicidal    ED Diagnosis:  (R45.851) Suicidal ideation  Comment:   Plan:        PMH:  No past medical history on file.     Code Status:  No Order     Falls Risk: No Band: Not applicable    Current Living Situation/Residence: lives with a significant other     Elimination Status: Continent: Yes     Activity Level: Independent    Patients Preferred Language:  English     Needed: No    Vital Signs:  /75   Pulse 78   Temp 98.6  F (37  C) (Oral)   Resp 17   Ht 1.829 m (6')   Wt 90.7 kg (200 lb)   SpO2 99%   BMI 27.12 kg/m       Is the Patient Confused:  No    Last Food or Drink: 10/14/22 at 0505    Focused Assessment:      Behavioral Health (Adult) General Appearance WDL: WDL   Behavior WDL Behavior WDL: WDL  Interactions: eye contact intermittent; guarded; cooperative   Emotion Mood WDL Emotion/Mood/Affect WDL: .WDL except; emotion mood  Emotion/Mood: depressed; anxious   Perceptual State WDL Perceptual State WDL: WDL   Thought Process WDL Thought Process WDL: .WDL except; thought content  Delusions: no delusions  Thought Content: suicidal thoughts; worthlessness   C-SSRS (Recent) Q1 Wished to be Dead (Past Month): yes  Q2 Suicidal Thoughts (Past Month): yes  Q3 Suicidal Thought Method: yes  Q5 Suicide Intent with Specific Plan: yes  Q6 Suicide Behavior (Lifetime): yes  Within the Past 3 Months?: yes  Level of Risk per Screen: high risk  High Risk Required Interventions: Provider notified   Suicide/Homicide Risk Suicidality: thoughts and plan  Suicidal Ideation: Thoughts and plan  Homicidal/Assault Ideation: None   Intellectual Performance WDL Intellectual Performance WDL: WDL   Chemical Abuse/Addictions Alcohol: Daily  Last Use:: 10/12/22  (at 2300)  Withdrawal Symptoms: Tremors; Nausea; Other (comments)  (headache and anxiety)     Tests Performed:     Treatments Provided:  SEE MAR: Ativan given for increased anxiety/CIWA score of  13    Belongings Checklist Done and Signed by Patient: No, patient's belongings are with security    Covid: asymptomatic , negative    Additional Information: See social work notes, and previous care notes for more info. Patient is suicidal with a plan to jump off a bridge at time of discharge when speaking with nurses on 10/13. At 2200 on 10/13 patient report increased anxiety, mild headache, nausea and hand tremors to RN. RN updated MD and gave PRN Ativan for increased anxiety.    RN: Barak Wu RN 10/14/2022 4:58 AM

## 2022-10-14 NOTE — PROGRESS NOTES
"Pt arrives on the unit at 1200. Male PA assist with belongings and clothing search. Pt is coop with assessment. His mood and affect are depressed. Pt reports that for the last ten months he has thought \"every day\" about dying by suicide. Pt reports no previous suicide attempts. When asked about family history, he states that one daughter has had two attempts, the other daughter, one attempt. Pt also reports having a sister with chemical dependency. Pt reports stressors as fractured relationships with his daughters and his wife. All family members live together. Pt states: \"My wife and I fight every day.\"     Pt works as a  and states that his employer was supportive when he called him from the ER.     Pt states that he has frequently his his head over a number of years. He states that he has done this by hitting his head on a low ceiling at his work while carrying cases of beer, or by \"falling down my stairs at home.\" Writer asks pt if he is intoxicated when these falls happen. Pt states: \"Just assume I've been drinking. I drink every day.\" Pt goes on to say that his history and habit of drinking is long-standing and \"I can have 4-5 drinks and appear focused and attentive in a conversation with someone.\" Pt states that he has never been to treatment or had an inpatient psychiatric hospitalization.     Pt meets with provider this afternoon. He is given a schedule of his medications by her. Writer gives pt hydroxyzine 50 mg po prn for pt report of increased anxiety. Pt denies pain or nausea. No tremors or sweating noted. Writer encourages pt to eat more as was given scheduled vitamins.     Pt has been resting in his room. Report given to evening shift.     Berta Sandoval RN    "

## 2022-10-14 NOTE — ED NOTES
Pt is sleeping.  Planning report to [address] [phone].  Pt is voluntary.  Wearing spice.    1:1 staff in room.

## 2022-10-14 NOTE — ED NOTES
Patient reporting increased anxiety. States he is having very dark thoughts and he is tearful. CIWA assessment documented and will update provider.

## 2022-10-14 NOTE — CONSULTS
Name:  Osmani Salazar  : 1975  Date:   10/14/2022  Location of patient: [facility]   Location of doctor: Office [facility]  Length of consult:  10 minutes     Phone Consult:  46 year old male with Depression and Suicidal Ideations who is voluntary for inpatient mental health admission.    Medically cleared by the ED.     Voluntary for inpatient mental health admission    Discussed with staff on site:  yes, Laura Galvan M.D.  Psychiatry

## 2022-10-14 NOTE — ED NOTES
"ED Behavioral Health Patient Transition of Care Note    Assuming care from:  Willie Garcia M.D.    Brief HPI: 46 year old male signed out to me by the above provider. See initial ED Provider note for full details of the presentation.   In brief, patient  presents with plans for killing himself. Her arrived to ED with daughter. Patient states that he has been trying to find a way to kill himself for the last year but had never come up with a good plan. He states that he now knows of a bridge in [place] and plans to kill himself by jumping off this bridge. Patient states that if he goes home he is going to kill himself within the next 72 hours. Patient states that his wife knows that he has been struggling with his mental health, but states that she \"doesn't take it seriously.\" States that he tried talking to her about it again today, but she did not take it seriously      Home meds reviewed and ordered/administered: Yes  Medically stable for inpatient mental health admission: Yes.  Evaluated by mental health: Yes. The recommendation is for inpatient mental health treatment. Bed search in process  Safety concerns: At the time I received sign out, there were no safety concerns.      Significant events during shift:      Been accepted by Dr. Hurley at [facility].  EMTALA filled out.    Signed out to Dr. Malaika ny  admission.       1. Suicidal ideation         Brett Melchor MD  10/14/22 0010       Brett Melchor MD  10/14/22 0436    "

## 2022-10-14 NOTE — PLAN OF CARE
Problem: Sleep Disturbance  Goal: Adequate Sleep/Rest  Outcome: Progressing     Problem: Depressive Signs/Symptoms  Goal: Optimized Nutrition Intake  Outcome: Progressing       Will monitor for withdrawal. Will encourage adequate fluid intake. Will encourage participation in groups as pt acclimates to unit.    Pt is given tour of unit, given folder with unit information, all questions answered at this time.     Room checks/sweeps performed per unit protocol.     Berta Sandoval RN

## 2022-10-14 NOTE — ED NOTES
ER DISCHARGE NOTE:   Osmani Salazar  MRN: [id number]      (R45.851) Suicidal ideation  Plan: IP treatment at [facility]        Osmani Salazar discharged via stretcher with EMS staff.    Verbalized understanding of discharge instructions.   Prescriptions: non prescribed.
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
## 2022-10-14 NOTE — PLAN OF CARE
10/14/22 1450   Patient Belongings   Patient Belongings locker;sent to security per site process   Patient Belongings Put in Hospital Secure Location (Security or Locker, etc.) cash/credit card;cell phone/electronics;clothing;keys;money (see comment);wallet;shoes   Belongings Search Yes   Clothing Search Yes     Locker: 1 pair black boots, 1 baseball hat, 1 black wallet, 1 pair gray jeans, 1 , 1 pair black socks, 1 bottle flavored water, 1 black t-shirt, 1 key ring w/ 6 keys, 1 gray lighter, 1 iphone, 1 iphone , 1 drivers license, 1 taco [employer] gift card, 1 Docs giftcard, 1 [employer] members card, 1 [employer] giftcard, 1 [employer] insurance card, 1 [employer] goldpass, 1 [employer] giftcard    Security:1 [employer] Mastercard *6365, 1 Visa giftcard *0329, and $75 cash (singles)  A               Admission:  I am responsible for any personal items that are not sent to the safe or pharmacy.  [facility] is not responsible for loss, theft or damage of any property in my possession.    Signature:  _________________________________ Date: _______  Time: _____                                              Staff Signature:  ____________________________ Date: ________  Time: _____      2nd Staff person, if patient is unable/unwilling to sign:    Signature: ________________________________ Date: ________  Time: _____     Discharge:  [facility] has returned all of my personal belongings:    Signature: _________________________________ Date: ________  Time: _____                                          Staff Signature:  ____________________________ Date: ________  Time: _____

## 2022-10-15 NOTE — PLAN OF CARE
"  Problem: Depressive Signs/Symptoms  Goal: Optimized Cognitive Function  Outcome: Not Progressing   Goal Outcome Evaluation:    Patient has been resting in bed for much of the shift, however did spend meal time in the dining area and came out for a shower.  Patient had a visit from his wife which he said was \"okay\".  Patient was tearful during the visit.    Patient scored a 7 and a 6 on the MSSA precautions.  Reports the longest he has been sober is 3 days since 2001.    Self reports feeling \"trapped, resentful and scruffy\".  After taking a shower patient did feel better but wants to shave tomorrow. Of note, patient's eyes were extremely reddened (changed from prior to shower) when he came out of the shower which was stated to patient who said \"this is normal for me but people notice and ask me if I had a rough night all the time\".   Rated anxiety 8/10.  After PRN Hydroxyzine (see MAR) and a nap patient rated the anxiety at 5/10.  Depression is \"up high\".    Denies SI/SIB/HI or AVH.                      "

## 2022-10-15 NOTE — PLAN OF CARE
"Goal Outcome Evaluation:    Plan of Care Reviewed With: patient      RN Note:    Patient presents with Blunted/Flat affect and depressed and anxious mood. Patient was calm and cooperative during this shift. Patient reports ongoing anxiety and depression r/t work and family stressors. Patient is hopeful for a change in medications and learn coping strategies. Patient denies SI, SIB, HI, and AVH. Patient denies pain. Patient spent most of the shift resting in his room. Patient avoids social contact with his peers. Patient is med-compliant. No medication side effects observed or endorsed by patient. Patient had a visit with his wife, he reports went well.    MSSA this shift: 0830 = 5, 1215 = 4  Patient states \"I have gone through withdrawal before. I feel fine right now.\" Patient ate 100% of both meals. Patient encouraged to inform nurses if he feels symptomatic in any way. Will continue to monitor withdrawal process.    /81 (BP Location: Right arm, Patient Position: Sitting, Cuff Size: Adult Regular)   Pulse 91   Temp 97.2  F (36.2  C) (Temporal)   Resp 16   Ht 1.829 m (6')   Wt 83.6 kg (184 lb 3.2 oz)   SpO2 99%   BMI 24.98 kg/m    "

## 2022-10-15 NOTE — CARE PLAN
"Initial Psychosocial Assessment    I have reviewed the chart, met with the patient, and developed Care Plan.  Information for assessment was obtained from:     Patient: Interview and Chart: Review    Presenting Problem:  Per HPI: Osmani Salazar is a 46 year old male who presents for evaluation of suicidal ideation with a plan. Patient presents voluntarily with his daughter after he called her this evening telling her that he was planning on killing himself. Daughter then brought patient into the ED. Patient states that he has been trying to find a way to kill himself for the last year but had never come up with a good plan. He states that he now knows of a bridge in [place] and plans to kill himself by jumping off this bridge. Patient states that if he goes home he is going to kill himself within the next 72 hours. Patient states that his wife knows that he has been struggling with his mental health, but states that she \"doesn't take it seriously.\" States that he tried talking to her about it again today, but she did not take it seriously, which prompted him to call his daughter, who then brought patient here for evaluation.     History of Mental Health and Chemical Dependency:  Pt has never been hospitalized for mental health issues nor attended any MH programming. Pt reports that he has went to detox in the past. Pt notes having a long hx of issues with alcohol but has not attended any formal treatment. Pt has went to several AA meetings.    Family Description (Constellation, Family Psychiatric History):  Pt was born in [place] and moved to [place] in 1993 with his family. Pt has 2 daughters from a previous relationship and is  to his wife of over 10 years.     Significant Life Events (Illness, Abuse, Trauma, Death):  Denies    Living Situation:  Pt lives in a home in [place] with his wife of over 10 years, 2 daughters (21 and 19), 2 dogs and a cat.     Educational Background:  [employer]    Occupational History:  full time      Financial Status:  Supports himself    Legal Issues:  None    Ethnic/Cultural Issues:  American     Spiritual Orientation:  Episcopal      Service History:  None    Social Functioning (organization, interests):  Enjoys spending time with friends, disc golf and kayaking.     Current Treatment Providers are:  None    Social Service Assessment/Plan:  Patient has been admitted for psychiatric stabilization for this acute crisis. Patient will meet with treatment team including a psychiatrist to have psychiatric assessment and medication management. Team will coordinate with the any outpatient medication providers to review and adjust medications as appropriate. Staff will provide therapeutic programming and structure to help maintain a safe environment for healing. Staff will continue to assess patient as needed. Patient will participate in unit groups and activities. Patient will receive individual and group support on the unit. CTC will coordinate with outpatient providers and will place referrals to ensure appropriate follow up care is in place.

## 2022-10-15 NOTE — PLAN OF CARE
"MSSA done at 0600 hours. Pt reported \"not sleeping the greatest\". Pt agreeable to vital signs and mssa score was 6. Pt appeared to be resting during next safety check.   "

## 2022-10-15 NOTE — PHARMACY-ADMISSION MEDICATION HISTORY
Admission Medication History Completed by Pharmacy    See [facility] Admission Navigator for allergy information, preferred outpatient pharmacy, prior to admission medications and immunization status.     Medication History Sources:     Patient    [facility]    Electronic fill history    Changes made to PTA medication list (reason):    Added: cetirizine (per pt, fill hx), albuterol prn (per pt, fill hx)    Deleted: None    Changed: None    Additional Information:    Patient reported he was also taking Qvar in addition to the Dulera inhaler. Both inhalers have a steroid component, so it would be unusual to be on both. His [facility] was called and they reported the patient had the Dulera filled and picked up May 2022. They said the patient had an older Qvar prescription on file 40mcg 1 puff BID, but it was not filled/. Unsure how old the Qvar inhaler is the patient has, but it does not appear to be current after calling [facility] and via chart review. Would recommend just continuing the Dulera for now based on patient fill history.     Of note, patient also reports being on citalopram a few weeks ago, but he reported he had terrible stomach upset while taking it, so he stopped it.    Prior to Admission medications    Medication Sig Last Dose Taking? Auth Provider Long Term End Date   albuterol (PROAIR HFA/PROVENTIL HFA/VENTOLIN HFA) 108 (90 Base) MCG/ACT inhaler Inhale 2 puffs into the lungs every 4 hours as needed for shortness of breath / dyspnea or wheezing  Yes Unknown, Entered By History Yes    azelastine (ASTELIN) 0.1 % nasal spray Spray 1-2 sprays into both nostrils 2 times daily as needed for rhinitis Past Week at Last filled 7/20/22 for 30 days Yes Unknown, Entered By History     cetirizine (ZYRTEC) 10 MG tablet Take 10 mg by mouth daily as needed for allergies Past Week Yes Unknown, Entered By History     mometasone-formoterol (DULERA) 200-5 MCG/ACT inhaler Inhale 2 puffs into the lungs 2  times daily Past Week at Last filled 5/25/22 for 30 days Yes Unknown, Entered By History Yes        Date completed: 10/14/22    Medication history completed by: Lisa Raza RP

## 2022-10-15 NOTE — PLAN OF CARE
Goal Outcome Evaluation:    Plan of Care Reviewed With: patient      Patient continues to report ongoing anxiety and depression r/t external factors. Patient requested and received PRN Hydroxyzine 50 mg for anxiety 6/10. Patient reports this as effective. He denies current SI, SIB, HI, AVH.    MSSA score is 5 for both evening assessments. Patient is up for meals, eating in the dining room, otherwise spends his time in his room resting in bed or reading. Will continue to monitor.    /77 (BP Location: Right arm, Patient Position: Sitting, Cuff Size: Adult Regular)   Pulse 92   Temp 98  F (36.7  C) (Temporal)   Resp 16   Ht 1.829 m (6')   Wt 83.6 kg (184 lb 3.2 oz)   SpO2 99%   BMI 24.98 kg/m

## 2022-10-16 NOTE — PLAN OF CARE
"Pt was awake to void. MSSA done with score of 6. Pt asked how he was sleeping and pt stated \"Fine\". Pt returned to bed and appears to be resting.       "

## 2022-10-16 NOTE — PLAN OF CARE
Goal Outcome Evaluation:    Plan of Care Reviewed With: patient      Problem: Alcohol Withdrawal  Goal: Alcohol Withdrawal Symptom Control  Outcome: Progressing     Pt presents with a flat affect, calm mood. Endorsed anxiety 5/10, declined intervention, depressed 4/10, denied SI/HI, hallucinations, pain, and contracted for safety. Pt's MSSA was 6, the scores have been less than 8 since pt was admitted, please discuss in team to see if need to be discontinued. Visible in the milieu, observed watching TV with peers. Did not observe pt engaging with other pts or staff, very guarded, does not initiate conversation, answers any question with short answers, matter of fact kind. Pt's wife visited, went well. Compliant with all scheduled medications, no PRN given. Pt was more bright and engaged with writer about his treatment after visit. Pt asked about discharge, was encouraged to bring with provider and  tomorrow, was agreeable. Also, pt check with writer about his medications, was explained how medications are prescribed and encouraged to ask for PRNs as needed. Pt continues on seizure/suicide/withdrawal precautions, no behaviors this shift.

## 2022-10-17 NOTE — PLAN OF CARE
Goal Outcome Evaluation:    Plan of Care Reviewed With: patient      Problem: Adult Behavioral Health Plan of Care  Goal: Plan of Care Review  Outcome: Progressing  Flowsheets (Taken 10/17/2022 0950)  Patient Agreement with Plan of Care: agrees   Pt presents with a bright affect, excited about discharged later today. Calm mood. Denied pain, all mental health symptoms and contracts, still appears guarded while answering the assessment questions. More up beat today than yesterday, observed socializing with select peers. Compliant with all scheduled medications, no side effects noted or reports. PRN Astelin nasal spray given by request, helpful. The pt is planning to discharge this afternoon. MSSA was 5 due vitals, denied any withdrawal symptoms and none was observed by writer. Pt is on seizure/suicide/withdrawal precautions, no activities or behaviors noted this shift.

## 2022-10-17 NOTE — PLAN OF CARE
OCCUPATIONAL THERAPY GROUP NOTE:     10/16/22 2057   Group Therapy Session   Group Attendance attended group session   Time Session Began 2000   Time Session Ended 2045   Total Time (minutes) 45   Total # Attendees 5   Group Type recreation   Group Topic Covered structured socialization;leisure exploration/use of leisure time;coping skills/lifestyle management   Group Session Detail OT Leisure Group   Patient Response/Contribution cooperative with task;listened actively;organized;pleasant;engaged;flat affect   Patient Participation Detail Pt actively participated in a structured occupational therapy group with a focus on social and leisure engagement via a group game. Pt was able to follow multi-step directions and was attentive to task parameters throughout. Pt demonstrated strategic planning and good task organization. Focused and engaged throughout full duration of group, and appeared to brighten as the activity progressed. Pt offered assistance to peers as needed throughout the task.
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
## 2022-10-17 NOTE — PLAN OF CARE
Tasks Complete:    CTC met with pt to review and discuss resources and recommendation of services.     CTC called to schedule therapy apt. CTC assisted pt with completing paperwork via portal. PT completed and CTC called with pt to update pt email. PT is scheduled to see   CHARITY HARRISON, PhD, [facility] at 5pm   Discharge fax: [phone]     CTC called [facility] to schedule Psychiatry . Their policy states that pt will need to complete KJ and psychiatrist will need all outpatient requested documents before scheduling. [facility] requested for KJ to be sent to [facility] via email . KJ signed by pt and discharge summary faxed.      AVS completed     Post round meeting with team @9:30 am updates: He is goal and future oriented. He participated in groups and social with peers. He notes that his depressive symptoms have improved with no SI. He wants to discharge today    Current Symptoms include the following: See RN note      Addressed patient needs/concerns: No needs and concerns noted     Discharge Plan or Goal   Plan  Home with outpatient services     Barriers to Discharge   PT will discharge today     Referral Status  Completed. See AVS     Legal Status  Voluntary

## 2022-10-17 NOTE — PLAN OF CARE
Pt out to counter at approximately 0240 hours requesting medication for sleep. Pt stating he was having difficulty sleeping. VS taken and MSSA completed with score of 6 due to elevated heart rate. Pt given trazodone as requested. Pt stated thank you and laid down in his room

## 2022-10-17 NOTE — PROGRESS NOTES
10/17/22 1343   Group Therapy Session   Group Attendance attended group session   Time Session Began 1115   Time Session Ended 1200   Total Time (minutes) 10   Total # Attendees 6   Group Type task skill   Group Session Detail Wellness group discussion utilizing a metaphor of rebuilding life or making a change with puting together a jigsaw puzzle and jigsaw puzzle challenge to foster motivation, problem solving, organization/planning, logic skills, frustration tolerance, follow through, trial and error, reality based activity, mood stabilization, concentration, teamwork, socialization and an opportunity to experience success   Patient Participation Detail Pt joined group only briefly.  He shared during the check in question that he regrets driving after drinking, especially when he had planned ahead to use a ride service.  Pt began work with a partner on a puzzle, he was pulled out by other staff and did not return to group.  Pt was likely preparing for discharge as planned for later today.  No charge.

## 2022-10-17 NOTE — DISCHARGE INSTRUCTIONS
Behavioral Discharge Planning and Instructions    Summary: You were admitted on 10/14/2022  due to depressive symptoms and suicidal ideations with a plan.  You were treated by Dona Perales NP and discharged on 10/17/2022 from [place] [address] to Home.    Main Diagnosis:   Major depressive disorder, severe, single episode, without psychosis  Rule out gambling addiction  Alcohol use disorder, moderate  Nicotine use disorder, mild  Asthma  Environmental allergies    Health Care Follow-up:       Therapy Appointment  Provider: Mitchell Montgomery PHD,[facility]  Date/Time: 10/26/22 5:00PM ( TELEHEALTH)  Phone: [phone]  Fax Discharge Summary:  [phone] or [phone]  A link for your upcoming appointment has been sent to your email [email or web address].If you have difficulties accessing your appointment, please call yesika at  [phone]    Psychiatry Appointment  Provider: Dona Gonzalez MD ( in-person)  Date/Time: They will call you directly to schedule once they have received the release of information and discharge paperwork. Please feel free to call the clinic directly to inquire about status of services. They have immediate openings.  Fax Discharge Summary: [phone]  Phone: [phone]    Additional recommended resources and services     Recovery/Sobriety Resources    : The  are people who have been successful in the recovery process who help others experiencing similar situations. Through shared understanding, respect, and mutual empowerment, peer support workers help people become and stay engaged in the recovery process and reduce the likelihood of relapse. Peer support services can effectively extend the reach of treatment beyond the clinical setting into the everyday environment of those seeking a successful, sustained recovery process.         Phone: [employer] at [phone], and their website is:  https://Call [employer]/    Find AA meetings near you         (They will tell you if they are virtual or in person)         https://www.aastpaul.org/?topic=8&article=76     Daily AA meetings near you         Website that notes daily meetings in your area.        https://[employer]/aa_meetings/[place]/ugo/    Mental Health services and supports     Case Management: Case management provides services that help individuals achieve recovery and allows them to function independently in the community.  work to build relationships that foster hope, courage and resilience by building on the individual's strengths. Individuals meet with  once a month (at a minimum) to develop a roadmap that supports their journey to recovery.         Phone:[phone]    Attend all scheduled appointments with your outpatient providers. Call at least 24 hours in advance if you need to reschedule an appointment to ensure continued access to your outpatient providers.     Major Treatments, Procedures and Findings:  You were provided with: a psychiatric assessment, assessed for medical stability, medication evaluation and/or management, group therapy, family therapy, milieu management, and medical interventions    Symptoms to Report: feeling more aggressive, increased confusion, losing more sleep, mood getting worse, or thoughts of suicide    Early warning signs can include: increased depression or anxiety sleep disturbances increased thoughts or behaviors of suicide or self-harm  increased unusual thinking, such as paranoia or hearing voices    Safety and Wellness:  Take all medicines as directed.  Make no changes unless your doctor suggests them.      Follow treatment recommendations.  Refrain from alcohol and non-prescribed drugs.  If there is a concern for safety, call 131.    Resources:   Crisis Intervention: 907.489.9148 or 648-917-9284 (TTY: 146.882.4317).  Call anytime for help.  National [place] on  "Mental Illness (www.[place].patrick.org): 326.939.2527 or 100-987-5474.  [place] Association for Children's Mental Health (www.[facility].org): 615.888.9494.  Alcoholics Anonymous (www.alcoholics-anonymous.org): Check your phone book for your local chapter.  Suicide Awareness Voices of Education (SAVE) (www.save.org): 732-682-VODF (0329)  National Suicide Prevention Line (www.mentalhealthmn.org): 607-834-NECN (5107)  Mental Health Consumer/Survivor Network of [place] (www.mhcsn.net): 627.432.7682 or 511-057-9289  Mental Health Association of [place] (www.mentalhealth.org): 352.600.9090 or 381-255-3385  Self- Management and Recovery Training., SMART-- Toll free: 237.929.2236  www.[employer].Spoonity  [facility] Crisis Response - Adult 155 180-5846  Text 4 Life: txt \"LIFE\" to 09620 for immediate support and crisis intervention  Crisis text line: Text \"[place]\" to 123021. Free, confidential, 24/7.  Crisis Intervention: 715.966.3289 or 101-184-9630. Call anytime for help.   [facility] Crisis Response Team - Child: [phone]    General Medication Instructions:   See your medication sheet(s) for instructions.   Take all medicines as directed.  Make no changes unless your doctor suggests them.   Go to all your doctor visits.  Be sure to have all your required lab tests. This way, your medicines can be refilled on time.  Do not use any drugs not prescribed by your doctor.  Avoid alcohol.    Advance Directives:   Scanned document on file with [facility]? Minor-N/A  Is document scanned? Minor-N/A  Honoring Choices Your Rights Handout: Informed and given  Was more information offered? Pt declined    The Treatment team has appreciated the opportunity to work with you. If you have any questions or concerns about your recent admission, you can contact the unit which can receive your call 24 hours a day, 7 days a week. They will be able to get in touch with a Provider if needed. The unit number is [phone] .  "

## 2022-10-17 NOTE — PLAN OF CARE
Goal Outcome Evaluation:         Patient has been visible in the lounge watching TV with peers, eating will and medication compliant.  Requested and received PRN Hydroxyzine and nasal spray (see MAR).  Anxiety 5/10.  Denies SI/SIB/HI or AVH.  Patient has a flat/blunted affect and responds with minimal answers.      MSSA x 2 = 6.  Patient denies any withdrawal symptoms.

## 2022-10-17 NOTE — PLAN OF CARE
"  Problem: Suicide Risk  Goal: Absence of Self-Harm  Outcome: Met     Problem: Sleep Disturbance  Goal: Adequate Sleep/Rest  Outcome: Met     Problem: Depressive Signs/Symptoms  Goal: Optimized Cognitive Function  Outcome: Met  Goal: Enhanced Self-Esteem and Confidence (Depressive Signs/Symptoms)  Outcome: Met  Goal: Optimized Nutrition Intake  Outcome: Met  Goal: Improved Sleep (Depressive Signs/Symptoms)  Outcome: Met  Goal: Enhanced Social, Occupational or Functional Skills (Depressive Signs/Symptoms)  Outcome: Met     Problem: Alcohol Withdrawal  Goal: Alcohol Withdrawal Symptom Control  Outcome: Met     Problem: Suicidal Behavior  Goal: Suicidal Behavior is Absent or Managed  Outcome: Met     Problem: Adult Behavioral Health Plan of Care  Goal: Plan of Care Review  10/17/2022 1343 by Shira Vaughan, RN  Outcome: Met  10/17/2022 1054 by Shira Vaughan, RN  Outcome: Progressing  Flowsheets (Taken 10/17/2022 0950)  Patient Agreement with Plan of Care: agrees  Goal: Patient-Specific Goal (Individualization)  Description: You can add care plan individualizations to a care plan. Examples of Individualization might be:  \"Parent requests to be called daily at 9am for status\", \"I have a hard time hearing out of my right ear\", or \"Do not touch me to wake me up as it startles me\".  Outcome: Met  Goal: Individualized Daily Interaction Plan (IDIP)  Outcome: Met  Goal: Adheres to Safety Considerations for Self and Others  Outcome: Met  Intervention: Develop and Maintain Individualized Safety Plan  Recent Flowsheet Documentation  Taken 10/17/2022 0950 by Shira Vaughan, RN  Safety Measures:   safety rounds completed   suicide assessment completed  Goal: Absence of New-Onset Illness or Injury  Outcome: Met  Intervention: Identify and Manage Fall Risk  Recent Flowsheet Documentation  Taken 10/17/2022 0950 by Shira Vaughan, RN  Safety Measures:   safety rounds completed   suicide assessment completed  Goal: Optimized Coping " Skills in Response to Life Stressors  Outcome: Met  Goal: Develops/Participates in Therapeutic [place] to Support Successful Transition  Outcome: Met  Intervention: Foster Therapeutic [place]  Recent Flowsheet Documentation  Taken 10/17/2022 5150 by Shira Vaughan RN  Trust Relationship/Rapport:   care explained   choices provided   empathic listening provided   questions answered   reassurance provided   thoughts/feelings acknowledged   Goal Outcome Evaluation:    Plan of Care Reviewed With: patient

## 2023-01-01 ENCOUNTER — APPOINTMENT (OUTPATIENT)
Dept: CT IMAGING | Facility: HOSPITAL | Age: 48
DRG: 405 | End: 2023-01-01
Attending: EMERGENCY MEDICINE
Payer: COMMERCIAL

## 2023-01-01 ENCOUNTER — APPOINTMENT (OUTPATIENT)
Dept: ULTRASOUND IMAGING | Facility: HOSPITAL | Age: 48
DRG: 405 | End: 2023-01-01
Attending: INTERNAL MEDICINE
Payer: COMMERCIAL

## 2023-01-01 ENCOUNTER — APPOINTMENT (OUTPATIENT)
Dept: INTERVENTIONAL RADIOLOGY/VASCULAR | Facility: HOSPITAL | Age: 48
DRG: 405 | End: 2023-01-01
Attending: RADIOLOGY
Payer: COMMERCIAL

## 2023-01-01 ENCOUNTER — APPOINTMENT (OUTPATIENT)
Dept: RADIOLOGY | Facility: HOSPITAL | Age: 48
DRG: 405 | End: 2023-01-01
Attending: INTERNAL MEDICINE
Payer: COMMERCIAL

## 2023-01-01 ENCOUNTER — HOSPITAL ENCOUNTER (INPATIENT)
Facility: HOSPITAL | Age: 48
LOS: 2 days | DRG: 405 | End: 2023-02-10
Attending: EMERGENCY MEDICINE | Admitting: INTERNAL MEDICINE
Payer: COMMERCIAL

## 2023-01-01 ENCOUNTER — APPOINTMENT (OUTPATIENT)
Dept: RADIOLOGY | Facility: HOSPITAL | Age: 48
DRG: 405 | End: 2023-01-01
Attending: EMERGENCY MEDICINE
Payer: COMMERCIAL

## 2023-01-01 ENCOUNTER — ANESTHESIA (OUTPATIENT)
Dept: INTERVENTIONAL RADIOLOGY/VASCULAR | Facility: HOSPITAL | Age: 48
DRG: 405 | End: 2023-01-01
Payer: COMMERCIAL

## 2023-01-01 VITALS
BODY MASS INDEX: 30.56 KG/M2 | HEIGHT: 72 IN | TEMPERATURE: 97 F | OXYGEN SATURATION: 93 % | RESPIRATION RATE: 27 BRPM | SYSTOLIC BLOOD PRESSURE: 83 MMHG | DIASTOLIC BLOOD PRESSURE: 37 MMHG | WEIGHT: 225.6 LBS

## 2023-01-01 VITALS — SYSTOLIC BLOOD PRESSURE: 115 MMHG | RESPIRATION RATE: 24 BRPM | HEART RATE: 101 BPM | DIASTOLIC BLOOD PRESSURE: 54 MMHG

## 2023-01-01 DIAGNOSIS — K92.0 HEMATEMESIS WITH NAUSEA: ICD-10-CM

## 2023-01-01 DIAGNOSIS — R57.8 HEMORRHAGIC SHOCK (H): ICD-10-CM

## 2023-01-01 DIAGNOSIS — K70.9 ALCOHOLIC LIVER DAMAGE (H): ICD-10-CM

## 2023-01-01 DIAGNOSIS — F10.929 ALCOHOLIC INTOXICATION WITH COMPLICATION (H): ICD-10-CM

## 2023-01-01 LAB
% LINING CELLS, BODY FLUID: 2 %
ABO/RH(D): NORMAL
ABO/RH(D): NORMAL
ABSOLUTE NEUTROPHILS, BODY FLUID: NORMAL
ALBUMIN BODY FLUID SOURCE: NORMAL
ALBUMIN FLD-MCNC: 0.3 G/DL
ALBUMIN SERPL BCG-MCNC: 1.1 G/DL (ref 3.5–5.2)
ALBUMIN SERPL BCG-MCNC: 1.6 G/DL (ref 3.5–5.2)
ALBUMIN SERPL BCG-MCNC: 2 G/DL (ref 3.5–5.2)
ALBUMIN SERPL BCG-MCNC: 2.2 G/DL (ref 3.5–5.2)
ALBUMIN SERPL BCG-MCNC: 2.3 G/DL (ref 3.5–5.2)
ALBUMIN SERPL BCG-MCNC: 2.3 G/DL (ref 3.5–5.2)
ALBUMIN SERPL BCG-MCNC: 2.4 G/DL (ref 3.5–5.2)
ALBUMIN SERPL BCG-MCNC: 2.5 G/DL (ref 3.5–5.2)
ALBUMIN SERPL BCG-MCNC: 2.5 G/DL (ref 3.5–5.2)
ALLEN'S TEST: ABNORMAL
ALLEN'S TEST: YES
ALLEN'S TEST: YES
ALP SERPL-CCNC: 115 U/L (ref 40–129)
ALP SERPL-CCNC: 124 U/L (ref 40–129)
ALP SERPL-CCNC: 284 U/L (ref 40–129)
ALP SERPL-CCNC: 291 U/L (ref 40–129)
ALP SERPL-CCNC: 694 U/L (ref 40–129)
ALP SERPL-CCNC: 85 U/L (ref 40–129)
ALT SERPL W P-5'-P-CCNC: 1321 U/L (ref 10–50)
ALT SERPL W P-5'-P-CCNC: 1455 U/L (ref 10–50)
ALT SERPL W P-5'-P-CCNC: 1543 U/L (ref 10–50)
ALT SERPL W P-5'-P-CCNC: 52 U/L (ref 10–50)
ALT SERPL W P-5'-P-CCNC: 60 U/L (ref 10–50)
ALT SERPL W P-5'-P-CCNC: 645 U/L (ref 10–50)
AMMONIA PLAS-SCNC: 87 UMOL/L (ref 16–60)
AMMONIA PLAS-SCNC: 96 UMOL/L (ref 16–60)
ANION GAP SERPL CALCULATED.3IONS-SCNC: 12 MMOL/L (ref 7–15)
ANION GAP SERPL CALCULATED.3IONS-SCNC: 24 MMOL/L (ref 7–15)
ANION GAP SERPL CALCULATED.3IONS-SCNC: 25 MMOL/L (ref 7–15)
ANION GAP SERPL CALCULATED.3IONS-SCNC: 27 MMOL/L (ref 7–15)
ANION GAP SERPL CALCULATED.3IONS-SCNC: 36 MMOL/L (ref 7–15)
ANION GAP SERPL CALCULATED.3IONS-SCNC: 37 MMOL/L (ref 7–15)
ANION GAP SERPL CALCULATED.3IONS-SCNC: 37 MMOL/L (ref 7–15)
ANTIBODY SCREEN: NEGATIVE
APPEARANCE FLD: CLEAR
APTT PPP: 88 SECONDS (ref 22–38)
AST SERPL W P-5'-P-CCNC: 168 U/L (ref 10–50)
AST SERPL W P-5'-P-CCNC: 180 U/L (ref 10–50)
AST SERPL W P-5'-P-CCNC: 6778 U/L (ref 10–50)
AST SERPL W P-5'-P-CCNC: ABNORMAL U/L (ref 10–50)
BASE EXCESS BLDA CALC-SCNC: -0.7 MMOL/L
BASE EXCESS BLDA CALC-SCNC: -15.3 MMOL/L
BASE EXCESS BLDA CALC-SCNC: -17.2 MMOL/L
BASE EXCESS BLDA CALC-SCNC: -18.3 MMOL/L
BASE EXCESS BLDA CALC-SCNC: -18.9 MMOL/L
BASE EXCESS BLDA CALC-SCNC: -2 MMOL/L
BASE EXCESS BLDA CALC-SCNC: -2.1 MMOL/L
BASE EXCESS BLDA CALC-SCNC: -20.4 MMOL/L
BASE EXCESS BLDA CALC-SCNC: -22.6 MMOL/L
BASE EXCESS BLDA CALC-SCNC: -24.4 MMOL/L
BASE EXCESS BLDA CALC-SCNC: 3.6 MMOL/L
BASOPHILS # BLD AUTO: 0.1 10E3/UL (ref 0–0.2)
BASOPHILS # BLD MANUAL: 0 10E3/UL (ref 0–0.2)
BASOPHILS NFR BLD AUTO: 0 %
BASOPHILS NFR BLD AUTO: 0 %
BASOPHILS NFR BLD AUTO: 1 %
BASOPHILS NFR BLD MANUAL: 0 %
BILIRUB DIRECT SERPL-MCNC: 2.96 MG/DL (ref 0–0.3)
BILIRUB DIRECT SERPL-MCNC: 2.97 MG/DL (ref 0–0.3)
BILIRUB DIRECT SERPL-MCNC: 4 MG/DL (ref 0–0.3)
BILIRUB SERPL-MCNC: 4.8 MG/DL
BILIRUB SERPL-MCNC: 5.9 MG/DL
BILIRUB SERPL-MCNC: 6.2 MG/DL
BILIRUB SERPL-MCNC: 6.4 MG/DL
BILIRUB SERPL-MCNC: 8.1 MG/DL
BILIRUB SERPL-MCNC: 9.1 MG/DL
BLD PROD TYP BPU: NORMAL
BLOOD COMPONENT TYPE: NORMAL
BUN SERPL-MCNC: 10.1 MG/DL (ref 6–20)
BUN SERPL-MCNC: 7 MG/DL (ref 6–20)
BUN SERPL-MCNC: 7 MG/DL (ref 6–20)
BUN SERPL-MCNC: 7.5 MG/DL (ref 6–20)
BUN SERPL-MCNC: 8.1 MG/DL (ref 6–20)
BUN SERPL-MCNC: 8.2 MG/DL (ref 6–20)
BUN SERPL-MCNC: 8.7 MG/DL (ref 6–20)
BUN SERPL-MCNC: 8.9 MG/DL (ref 6–20)
BUN SERPL-MCNC: 9 MG/DL (ref 6–20)
BURR CELLS BLD QL SMEAR: ABNORMAL
CALCIUM SERPL-MCNC: 4.4 MG/DL (ref 8.6–10)
CALCIUM SERPL-MCNC: 6.5 MG/DL (ref 8.6–10)
CALCIUM SERPL-MCNC: 6.5 MG/DL (ref 8.6–10)
CALCIUM SERPL-MCNC: 6.8 MG/DL (ref 8.6–10)
CALCIUM SERPL-MCNC: 6.9 MG/DL (ref 8.6–10)
CALCIUM SERPL-MCNC: 7 MG/DL (ref 8.6–10)
CALCIUM SERPL-MCNC: 7.1 MG/DL (ref 8.6–10)
CALCIUM SERPL-MCNC: 7.6 MG/DL (ref 8.6–10)
CALCIUM SERPL-MCNC: 7.8 MG/DL (ref 8.6–10)
CALCIUM, IONIZED MEASURED: 0.72 MMOL/L (ref 1.11–1.3)
CALCIUM, IONIZED MEASURED: 0.75 MMOL/L (ref 1.11–1.3)
CALCIUM, IONIZED MEASURED: 0.78 MMOL/L (ref 1.11–1.3)
CALCIUM, IONIZED MEASURED: 0.81 MMOL/L (ref 1.11–1.3)
CALCIUM, IONIZED MEASURED: 0.86 MMOL/L (ref 1.11–1.3)
CALCIUM, IONIZED MEASURED: 0.88 MMOL/L (ref 1.11–1.3)
CELL COUNT BODY FLUID SOURCE: NORMAL
CHLORIDE SERPL-SCNC: 100 MMOL/L (ref 98–107)
CHLORIDE SERPL-SCNC: 101 MMOL/L (ref 98–107)
CHLORIDE SERPL-SCNC: 101 MMOL/L (ref 98–107)
CHLORIDE SERPL-SCNC: 102 MMOL/L (ref 98–107)
CHLORIDE SERPL-SCNC: 102 MMOL/L (ref 98–107)
CHLORIDE SERPL-SCNC: 103 MMOL/L (ref 98–107)
CHLORIDE SERPL-SCNC: 104 MMOL/L (ref 98–107)
CHLORIDE SERPL-SCNC: 104 MMOL/L (ref 98–107)
CHLORIDE SERPL-SCNC: 94 MMOL/L (ref 98–107)
CK SERPL-CCNC: ABNORMAL U/L (ref 39–308)
CODING SYSTEM: NORMAL
COHGB MFR BLD: 90 % (ref 96–97)
COHGB MFR BLD: 93.1 % (ref 96–97)
COHGB MFR BLD: 93.6 % (ref 96–97)
COHGB MFR BLD: 94 % (ref 96–97)
COHGB MFR BLD: 94.1 % (ref 96–97)
COHGB MFR BLD: 96 % (ref 96–97)
COHGB MFR BLD: 96 % (ref 96–97)
COHGB MFR BLD: 96.2 % (ref 96–97)
COHGB MFR BLD: 97.3 % (ref 96–97)
COHGB MFR BLD: 98.7 % (ref 96–97)
COHGB MFR BLD: 99.4 % (ref 96–97)
COLOR FLD: YELLOW
CREAT SERPL-MCNC: 0.9 MG/DL (ref 0.67–1.17)
CREAT SERPL-MCNC: 1.18 MG/DL (ref 0.67–1.17)
CREAT SERPL-MCNC: 1.18 MG/DL (ref 0.67–1.17)
CREAT SERPL-MCNC: 2.25 MG/DL (ref 0.67–1.17)
CREAT SERPL-MCNC: 2.29 MG/DL (ref 0.67–1.17)
CREAT SERPL-MCNC: 2.31 MG/DL (ref 0.67–1.17)
CREAT SERPL-MCNC: 2.35 MG/DL (ref 0.67–1.17)
CREAT SERPL-MCNC: 2.36 MG/DL (ref 0.67–1.17)
CREAT SERPL-MCNC: 2.38 MG/DL (ref 0.67–1.17)
CROSSMATCH: NORMAL
DAT POLY: NEGATIVE
DEPRECATED HCO3 PLAS-SCNC: 12 MMOL/L (ref 22–29)
DEPRECATED HCO3 PLAS-SCNC: 12 MMOL/L (ref 22–29)
DEPRECATED HCO3 PLAS-SCNC: 13 MMOL/L (ref 22–29)
DEPRECATED HCO3 PLAS-SCNC: 19 MMOL/L (ref 22–29)
DEPRECATED HCO3 PLAS-SCNC: 20 MMOL/L (ref 22–29)
DEPRECATED HCO3 PLAS-SCNC: 21 MMOL/L (ref 22–29)
DEPRECATED HCO3 PLAS-SCNC: 24 MMOL/L (ref 22–29)
DEPRECATED HCO3 PLAS-SCNC: 24 MMOL/L (ref 22–29)
DEPRECATED HCO3 PLAS-SCNC: 9 MMOL/L (ref 22–29)
EOSINOPHIL # BLD AUTO: 0 10E3/UL (ref 0–0.7)
EOSINOPHIL # BLD AUTO: 0.1 10E3/UL (ref 0–0.7)
EOSINOPHIL # BLD AUTO: 0.2 10E3/UL (ref 0–0.7)
EOSINOPHIL # BLD MANUAL: 0 10E3/UL (ref 0–0.7)
EOSINOPHIL NFR BLD AUTO: 0 %
EOSINOPHIL NFR BLD AUTO: 1 %
EOSINOPHIL NFR BLD AUTO: 1 %
EOSINOPHIL NFR BLD MANUAL: 0 %
ERYTHROCYTE [DISTWIDTH] IN BLOOD BY AUTOMATED COUNT: 14.9 % (ref 10–15)
ERYTHROCYTE [DISTWIDTH] IN BLOOD BY AUTOMATED COUNT: 18.1 % (ref 10–15)
ERYTHROCYTE [DISTWIDTH] IN BLOOD BY AUTOMATED COUNT: 18.2 % (ref 10–15)
ERYTHROCYTE [DISTWIDTH] IN BLOOD BY AUTOMATED COUNT: 18.2 % (ref 10–15)
ERYTHROCYTE [DISTWIDTH] IN BLOOD BY AUTOMATED COUNT: 19 % (ref 10–15)
ERYTHROCYTE [DISTWIDTH] IN BLOOD BY AUTOMATED COUNT: 20.6 % (ref 10–15)
ERYTHROCYTE [DISTWIDTH] IN BLOOD BY AUTOMATED COUNT: 20.7 % (ref 10–15)
ETHANOL SERPL-MCNC: 0.29 G/DL
FIBRINOGEN PPP-MCNC: 99 MG/DL (ref 170–490)
FIBRINOGEN PPP-MCNC: <61 MG/DL (ref 170–490)
FIBRINOGEN PPP-MCNC: <61 MG/DL (ref 170–490)
GFR SERPL CREATININE-BSD FRML MDRD: 33 ML/MIN/1.73M2
GFR SERPL CREATININE-BSD FRML MDRD: 34 ML/MIN/1.73M2
GFR SERPL CREATININE-BSD FRML MDRD: 35 ML/MIN/1.73M2
GFR SERPL CREATININE-BSD FRML MDRD: 36 ML/MIN/1.73M2
GFR SERPL CREATININE-BSD FRML MDRD: 77 ML/MIN/1.73M2
GFR SERPL CREATININE-BSD FRML MDRD: 77 ML/MIN/1.73M2
GFR SERPL CREATININE-BSD FRML MDRD: >90 ML/MIN/1.73M2
GLUCOSE BLDC GLUCOMTR-MCNC: 102 MG/DL (ref 70–99)
GLUCOSE BLDC GLUCOMTR-MCNC: 102 MG/DL (ref 70–99)
GLUCOSE BLDC GLUCOMTR-MCNC: 107 MG/DL (ref 70–99)
GLUCOSE BLDC GLUCOMTR-MCNC: 110 MG/DL (ref 70–99)
GLUCOSE BLDC GLUCOMTR-MCNC: 112 MG/DL (ref 70–99)
GLUCOSE BLDC GLUCOMTR-MCNC: 112 MG/DL (ref 70–99)
GLUCOSE BLDC GLUCOMTR-MCNC: 117 MG/DL (ref 70–99)
GLUCOSE BLDC GLUCOMTR-MCNC: 121 MG/DL (ref 70–99)
GLUCOSE BLDC GLUCOMTR-MCNC: 123 MG/DL (ref 70–99)
GLUCOSE BLDC GLUCOMTR-MCNC: 124 MG/DL (ref 70–99)
GLUCOSE BLDC GLUCOMTR-MCNC: 127 MG/DL (ref 70–99)
GLUCOSE BLDC GLUCOMTR-MCNC: 128 MG/DL (ref 70–99)
GLUCOSE BLDC GLUCOMTR-MCNC: 128 MG/DL (ref 70–99)
GLUCOSE BLDC GLUCOMTR-MCNC: 131 MG/DL (ref 70–99)
GLUCOSE BLDC GLUCOMTR-MCNC: 133 MG/DL (ref 70–99)
GLUCOSE BLDC GLUCOMTR-MCNC: 134 MG/DL (ref 70–99)
GLUCOSE BLDC GLUCOMTR-MCNC: 135 MG/DL (ref 70–99)
GLUCOSE BLDC GLUCOMTR-MCNC: 136 MG/DL (ref 70–99)
GLUCOSE BLDC GLUCOMTR-MCNC: 138 MG/DL (ref 70–99)
GLUCOSE BLDC GLUCOMTR-MCNC: 141 MG/DL (ref 70–99)
GLUCOSE BLDC GLUCOMTR-MCNC: 143 MG/DL (ref 70–99)
GLUCOSE BLDC GLUCOMTR-MCNC: 144 MG/DL (ref 70–99)
GLUCOSE BLDC GLUCOMTR-MCNC: 144 MG/DL (ref 70–99)
GLUCOSE BLDC GLUCOMTR-MCNC: 52 MG/DL (ref 70–99)
GLUCOSE BLDC GLUCOMTR-MCNC: 57 MG/DL (ref 70–99)
GLUCOSE BLDC GLUCOMTR-MCNC: 67 MG/DL (ref 70–99)
GLUCOSE BLDC GLUCOMTR-MCNC: 69 MG/DL (ref 70–99)
GLUCOSE BLDC GLUCOMTR-MCNC: 69 MG/DL (ref 70–99)
GLUCOSE BLDC GLUCOMTR-MCNC: 75 MG/DL (ref 70–99)
GLUCOSE BLDC GLUCOMTR-MCNC: 76 MG/DL (ref 70–99)
GLUCOSE BLDC GLUCOMTR-MCNC: 77 MG/DL (ref 70–99)
GLUCOSE BLDC GLUCOMTR-MCNC: 77 MG/DL (ref 70–99)
GLUCOSE BLDC GLUCOMTR-MCNC: 80 MG/DL (ref 70–99)
GLUCOSE BLDC GLUCOMTR-MCNC: 81 MG/DL (ref 70–99)
GLUCOSE BLDC GLUCOMTR-MCNC: 82 MG/DL (ref 70–99)
GLUCOSE BLDC GLUCOMTR-MCNC: 91 MG/DL (ref 70–99)
GLUCOSE BLDC GLUCOMTR-MCNC: 92 MG/DL (ref 70–99)
GLUCOSE BLDC GLUCOMTR-MCNC: 99 MG/DL (ref 70–99)
GLUCOSE SERPL-MCNC: 110 MG/DL (ref 70–99)
GLUCOSE SERPL-MCNC: 118 MG/DL (ref 70–99)
GLUCOSE SERPL-MCNC: 120 MG/DL (ref 70–99)
GLUCOSE SERPL-MCNC: 137 MG/DL (ref 70–99)
GLUCOSE SERPL-MCNC: 159 MG/DL (ref 70–99)
GLUCOSE SERPL-MCNC: 493 MG/DL (ref 70–99)
GLUCOSE SERPL-MCNC: 69 MG/DL (ref 70–99)
GLUCOSE SERPL-MCNC: 93 MG/DL (ref 70–99)
GLUCOSE SERPL-MCNC: 96 MG/DL (ref 70–99)
HCO3 BLD-SCNC: 11 MMOL/L (ref 23–29)
HCO3 BLD-SCNC: 11 MMOL/L (ref 23–29)
HCO3 BLD-SCNC: 12 MMOL/L (ref 23–29)
HCO3 BLD-SCNC: 13 MMOL/L (ref 23–29)
HCO3 BLD-SCNC: 22 MMOL/L (ref 23–29)
HCO3 BLD-SCNC: 22 MMOL/L (ref 23–29)
HCO3 BLD-SCNC: 23 MMOL/L (ref 23–29)
HCO3 BLD-SCNC: 26 MMOL/L (ref 23–29)
HCO3 BLD-SCNC: 7 MMOL/L (ref 23–29)
HCO3 BLD-SCNC: 9 MMOL/L (ref 23–29)
HCO3 BLD-SCNC: 9 MMOL/L (ref 23–29)
HCT VFR BLD AUTO: 17.9 % (ref 40–53)
HCT VFR BLD AUTO: 18.6 % (ref 40–53)
HCT VFR BLD AUTO: 18.7 % (ref 40–53)
HCT VFR BLD AUTO: 21.5 % (ref 40–53)
HCT VFR BLD AUTO: 26.5 % (ref 40–53)
HCT VFR BLD AUTO: 27.1 % (ref 40–53)
HCT VFR BLD AUTO: 30.2 % (ref 40–53)
HGB BLD-MCNC: 6 G/DL (ref 13.3–17.7)
HGB BLD-MCNC: 6.2 G/DL (ref 13.3–17.7)
HGB BLD-MCNC: 6.3 G/DL (ref 13.3–17.7)
HGB BLD-MCNC: 6.4 G/DL (ref 13.3–17.7)
HGB BLD-MCNC: 6.6 G/DL (ref 13.3–17.7)
HGB BLD-MCNC: 6.7 G/DL (ref 13.3–17.7)
HGB BLD-MCNC: 6.9 G/DL (ref 13.3–17.7)
HGB BLD-MCNC: 7.6 G/DL (ref 13.3–17.7)
HGB BLD-MCNC: 7.7 G/DL (ref 13.3–17.7)
HGB BLD-MCNC: 8.7 G/DL (ref 13.3–17.7)
HGB BLD-MCNC: 8.7 G/DL (ref 13.3–17.7)
HGB BLD-MCNC: 9.2 G/DL (ref 13.3–17.7)
HGB BLD-MCNC: 9.8 G/DL (ref 13.3–17.7)
HOLD SPECIMEN: NORMAL
HOLD SPECIMEN: NORMAL
IMM GRANULOCYTES # BLD: 0.1 10E3/UL
IMM GRANULOCYTES # BLD: 0.3 10E3/UL
IMM GRANULOCYTES # BLD: 0.3 10E3/UL
IMM GRANULOCYTES NFR BLD: 1 %
IMM GRANULOCYTES NFR BLD: 2 %
IMM GRANULOCYTES NFR BLD: 3 %
INR PPP: 13.11 (ref 0.85–1.15)
INR PPP: 2.31 (ref 0.85–1.15)
INR PPP: 5.18 (ref 0.85–1.15)
INR PPP: 5.48 (ref 0.85–1.15)
INR PPP: 7.21 (ref 0.85–1.15)
ION CA PH 7.4: 0.68 MMOL/L (ref 1.11–1.3)
ION CA PH 7.4: 0.75 MMOL/L (ref 1.11–1.3)
ION CA PH 7.4: 0.83 MMOL/L (ref 1.11–1.3)
ION CA PH 7.4: 0.88 MMOL/L (ref 1.11–1.3)
ION CA PH 7.4: ABNORMAL
ION CA PH 7.4: ABNORMAL
ISSUE DATE AND TIME: NORMAL
LACTATE SERPL-SCNC: 17 MMOL/L (ref 0.7–2)
LACTATE SERPL-SCNC: 17 MMOL/L (ref 0.7–2)
LACTATE SERPL-SCNC: 18 MMOL/L (ref 0.7–2)
LACTATE SERPL-SCNC: 19 MMOL/L (ref 0.7–2)
LACTATE SERPL-SCNC: 21 MMOL/L (ref 0.7–2)
LACTATE SERPL-SCNC: >24 MMOL/L (ref 0.7–2)
LIPASE SERPL-CCNC: 253 U/L (ref 13–60)
LYMPHOCYTES # BLD AUTO: 2.1 10E3/UL (ref 0.8–5.3)
LYMPHOCYTES # BLD AUTO: 2.5 10E3/UL (ref 0.8–5.3)
LYMPHOCYTES # BLD AUTO: 2.8 10E3/UL (ref 0.8–5.3)
LYMPHOCYTES # BLD MANUAL: 1.3 10E3/UL (ref 0.8–5.3)
LYMPHOCYTES NFR BLD AUTO: 17 %
LYMPHOCYTES NFR BLD AUTO: 17 %
LYMPHOCYTES NFR BLD AUTO: 26 %
LYMPHOCYTES NFR BLD MANUAL: 7 %
LYMPHOCYTES NFR FLD MANUAL: 8 %
MAGNESIUM SERPL-MCNC: 1.6 MG/DL (ref 1.7–2.3)
MAGNESIUM SERPL-MCNC: 1.7 MG/DL (ref 1.7–2.3)
MAGNESIUM SERPL-MCNC: 1.7 MG/DL (ref 1.7–2.3)
MAGNESIUM SERPL-MCNC: 1.8 MG/DL (ref 1.7–2.3)
MCH RBC QN AUTO: 30 PG (ref 26.5–33)
MCH RBC QN AUTO: 30 PG (ref 26.5–33)
MCH RBC QN AUTO: 30.8 PG (ref 26.5–33)
MCH RBC QN AUTO: 31 PG (ref 26.5–33)
MCH RBC QN AUTO: 31.1 PG (ref 26.5–33)
MCH RBC QN AUTO: 34.2 PG (ref 26.5–33)
MCH RBC QN AUTO: 34.5 PG (ref 26.5–33)
MCHC RBC AUTO-ENTMCNC: 32.1 G/DL (ref 31.5–36.5)
MCHC RBC AUTO-ENTMCNC: 32.5 G/DL (ref 31.5–36.5)
MCHC RBC AUTO-ENTMCNC: 32.8 G/DL (ref 31.5–36.5)
MCHC RBC AUTO-ENTMCNC: 33.9 G/DL (ref 31.5–36.5)
MCHC RBC AUTO-ENTMCNC: 34.4 G/DL (ref 31.5–36.5)
MCHC RBC AUTO-ENTMCNC: 34.6 G/DL (ref 31.5–36.5)
MCHC RBC AUTO-ENTMCNC: 35.8 G/DL (ref 31.5–36.5)
MCV RBC AUTO: 105 FL (ref 78–100)
MCV RBC AUTO: 106 FL (ref 78–100)
MCV RBC AUTO: 87 FL (ref 78–100)
MCV RBC AUTO: 91 FL (ref 78–100)
MCV RBC AUTO: 96 FL (ref 78–100)
MONOCYTES # BLD AUTO: 1.4 10E3/UL (ref 0–1.3)
MONOCYTES # BLD AUTO: 1.4 10E3/UL (ref 0–1.3)
MONOCYTES # BLD AUTO: 1.7 10E3/UL (ref 0–1.3)
MONOCYTES # BLD MANUAL: 0.9 10E3/UL (ref 0–1.3)
MONOCYTES NFR BLD AUTO: 10 %
MONOCYTES NFR BLD AUTO: 12 %
MONOCYTES NFR BLD AUTO: 16 %
MONOCYTES NFR BLD MANUAL: 5 %
MONOS+MACROS NFR FLD MANUAL: 85 %
NEUTROPHILS # BLD AUTO: 11.1 10E3/UL (ref 1.6–8.3)
NEUTROPHILS # BLD AUTO: 5.9 10E3/UL (ref 1.6–8.3)
NEUTROPHILS # BLD AUTO: 8.4 10E3/UL (ref 1.6–8.3)
NEUTROPHILS # BLD MANUAL: 16.1 10E3/UL (ref 1.6–8.3)
NEUTROPHILS NFR BLD AUTO: 55 %
NEUTROPHILS NFR BLD AUTO: 67 %
NEUTROPHILS NFR BLD AUTO: 71 %
NEUTROPHILS NFR BLD MANUAL: 88 %
NEUTS BAND NFR FLD MANUAL: 4 %
NRBC # BLD AUTO: 0 10E3/UL
NRBC # BLD AUTO: 0 10E3/UL
NRBC # BLD AUTO: 0.1 10E3/UL
NRBC BLD AUTO-RTO: 0 /100
NRBC BLD AUTO-RTO: 0 /100
NRBC BLD AUTO-RTO: 1 /100
O2/TOTAL GAS SETTING VFR VENT: 0.4 %
O2/TOTAL GAS SETTING VFR VENT: 0.5 %
O2/TOTAL GAS SETTING VFR VENT: 0.5 %
O2/TOTAL GAS SETTING VFR VENT: 0.7 %
O2/TOTAL GAS SETTING VFR VENT: 0.8 %
O2/TOTAL GAS SETTING VFR VENT: 0.8 %
O2/TOTAL GAS SETTING VFR VENT: 1 %
O2/TOTAL GAS SETTING VFR VENT: 1 %
O2/TOTAL GAS SETTING VFR VENT: 30 %
O2/TOTAL GAS SETTING VFR VENT: 50 %
OTHER CELLS FLD MANUAL: 1 %
OXYHGB MFR BLD: 88.1 % (ref 96–97)
OXYHGB MFR BLD: 90.4 % (ref 96–97)
OXYHGB MFR BLD: 91.3 % (ref 96–97)
OXYHGB MFR BLD: 92.2 % (ref 96–97)
OXYHGB MFR BLD: 92.3 % (ref 96–97)
OXYHGB MFR BLD: 93.3 % (ref 96–97)
OXYHGB MFR BLD: 94.2 % (ref 96–97)
OXYHGB MFR BLD: 94.2 % (ref 96–97)
OXYHGB MFR BLD: 95.7 % (ref 96–97)
OXYHGB MFR BLD: 96.3 % (ref 96–97)
OXYHGB MFR BLD: 96.5 % (ref 96–97)
PCO2 BLD: 28 MM HG (ref 35–45)
PCO2 BLD: 29 MM HG (ref 35–45)
PCO2 BLD: 30 MM HG (ref 35–45)
PCO2 BLD: 30 MM HG (ref 35–45)
PCO2 BLD: 31 MM HG (ref 35–45)
PCO2 BLD: 32 MM HG (ref 35–45)
PCO2 BLD: 33 MM HG (ref 35–45)
PCO2 BLD: 35 MM HG (ref 35–45)
PCO2 BLD: 35 MM HG (ref 35–45)
PCO2 BLD: 36 MM HG (ref 35–45)
PCO2 BLD: 37 MM HG (ref 35–45)
PEEP: 10 CM H2O
PEEP: 5 CM H2O
PEEP: 8 CM H2O
PH BLD: 6.99 [PH] (ref 7.37–7.44)
PH BLD: 7 [PH] (ref 7.37–7.44)
PH BLD: 7.1 [PH] (ref 7.37–7.44)
PH BLD: 7.11 [PH] (ref 7.37–7.44)
PH BLD: 7.13 [PH] (ref 7.37–7.44)
PH BLD: 7.15 [PH] (ref 7.37–7.44)
PH BLD: 7.21 [PH] (ref 7.37–7.44)
PH BLD: 7.42 [PH] (ref 7.37–7.44)
PH BLD: 7.42 [PH] (ref 7.37–7.44)
PH BLD: 7.49 [PH] (ref 7.37–7.44)
PH BLD: 7.56 [PH] (ref 7.37–7.44)
PH: 6.99 (ref 7.35–7.45)
PH: 7.07 (ref 7.35–7.45)
PH: 7.2 (ref 7.35–7.45)
PH: 7.43 (ref 7.35–7.45)
PH: 7.48 (ref 7.35–7.45)
PH: 7.51 (ref 7.35–7.45)
PHOSPHATE SERPL-MCNC: 4.3 MG/DL (ref 2.5–4.5)
PHOSPHATE SERPL-MCNC: 5.2 MG/DL (ref 2.5–4.5)
PHOSPHATE SERPL-MCNC: 5.4 MG/DL (ref 2.5–4.5)
PHOSPHATE SERPL-MCNC: 7.9 MG/DL (ref 2.5–4.5)
PLAT MORPH BLD: ABNORMAL
PLATELET # BLD AUTO: 26 10E3/UL (ref 150–450)
PLATELET # BLD AUTO: 44 10E3/UL (ref 150–450)
PLATELET # BLD AUTO: 46 10E3/UL (ref 150–450)
PLATELET # BLD AUTO: 51 10E3/UL (ref 150–450)
PLATELET # BLD AUTO: 53 10E3/UL (ref 150–450)
PLATELET # BLD AUTO: 60 10E3/UL (ref 150–450)
PLATELET # BLD AUTO: 63 10E3/UL (ref 150–450)
PLATELET # BLD AUTO: 78 10E3/UL (ref 150–450)
PO2 BLD: 133 MM HG (ref 80–90)
PO2 BLD: 138 MM HG (ref 80–90)
PO2 BLD: 57 MM HG (ref 80–90)
PO2 BLD: 62 MM HG (ref 80–90)
PO2 BLD: 62 MM HG (ref 80–90)
PO2 BLD: 68 MM HG (ref 80–90)
PO2 BLD: 70 MM HG (ref 80–90)
PO2 BLD: 77 MM HG (ref 80–90)
PO2 BLD: 88 MM HG (ref 80–90)
PO2 BLD: 94 MM HG (ref 80–90)
PO2 BLD: 95 MM HG (ref 80–90)
POLYCHROMASIA BLD QL SMEAR: SLIGHT
POTASSIUM SERPL-SCNC: 3.7 MMOL/L (ref 3.4–5.3)
POTASSIUM SERPL-SCNC: 4.2 MMOL/L (ref 3.4–5.3)
POTASSIUM SERPL-SCNC: 4.7 MMOL/L (ref 3.4–5.3)
POTASSIUM SERPL-SCNC: 4.8 MMOL/L (ref 3.4–5.3)
POTASSIUM SERPL-SCNC: 4.8 MMOL/L (ref 3.4–5.3)
POTASSIUM SERPL-SCNC: 4.9 MMOL/L (ref 3.4–5.3)
POTASSIUM SERPL-SCNC: 5.5 MMOL/L (ref 3.4–5.3)
POTASSIUM SERPL-SCNC: 5.7 MMOL/L (ref 3.4–5.3)
POTASSIUM SERPL-SCNC: 5.8 MMOL/L (ref 3.4–5.3)
PROT FLD-MCNC: 0.6 G/DL
PROT SERPL-MCNC: 3.2 G/DL (ref 6.4–8.3)
PROT SERPL-MCNC: 3.4 G/DL (ref 6.4–8.3)
PROT SERPL-MCNC: 3.7 G/DL (ref 6.4–8.3)
PROT SERPL-MCNC: 3.8 G/DL (ref 6.4–8.3)
PROT SERPL-MCNC: 4.6 G/DL (ref 6.4–8.3)
PROT SERPL-MCNC: 6.3 G/DL (ref 6.4–8.3)
PROTEIN BODY FLUID SOURCE: NORMAL
RATE: 16 RR/MIN
RATE: 24 RR/MIN
RATE: 27 RR/MIN
RATE: 27 RR/MIN
RATE: 32 RR/MIN
RBC # BLD AUTO: 2.02 10E6/UL (ref 4.4–5.9)
RBC # BLD AUTO: 2.07 10E6/UL (ref 4.4–5.9)
RBC # BLD AUTO: 2.13 10E6/UL (ref 4.4–5.9)
RBC # BLD AUTO: 2.16 10E6/UL (ref 4.4–5.9)
RBC # BLD AUTO: 2.52 10E6/UL (ref 4.4–5.9)
RBC # BLD AUTO: 2.99 10E6/UL (ref 4.4–5.9)
RBC # BLD AUTO: 3.15 10E6/UL (ref 4.4–5.9)
RBC # FLD: 1000 /UL
RBC MORPH BLD: ABNORMAL
SODIUM SERPL-SCNC: 137 MMOL/L (ref 136–145)
SODIUM SERPL-SCNC: 140 MMOL/L (ref 136–145)
SODIUM SERPL-SCNC: 142 MMOL/L (ref 136–145)
SODIUM SERPL-SCNC: 146 MMOL/L (ref 136–145)
SODIUM SERPL-SCNC: 147 MMOL/L (ref 136–145)
SODIUM SERPL-SCNC: 150 MMOL/L (ref 136–145)
SODIUM SERPL-SCNC: 151 MMOL/L (ref 136–145)
SPECIMEN EXPIRATION DATE: NORMAL
TEMPERATURE: 37 DEGREES C
UNIT ABO/RH: NORMAL
UNIT NUMBER: NORMAL
UNIT STATUS: NORMAL
UNIT TYPE ISBT: 5100
UNIT TYPE ISBT: 6200
UNIT TYPE ISBT: 7300
UNIT TYPE ISBT: 8400
UPPER GI ENDOSCOPY: NORMAL
VENTILATION MODE: ABNORMAL
VENTILATION MODE: ABNORMAL
VENTILATOR TIDAL VOLUME: 480 ML
VENTILATOR TIDAL VOLUME: 500 ML
VENTILATOR TIDAL VOLUME: 540 ML
WBC # BLD AUTO: 10.6 10E3/UL (ref 4–11)
WBC # BLD AUTO: 11.9 10E3/UL (ref 4–11)
WBC # BLD AUTO: 12 10E3/UL (ref 4–11)
WBC # BLD AUTO: 12.2 10E3/UL (ref 4–11)
WBC # BLD AUTO: 14.2 10E3/UL (ref 4–11)
WBC # BLD AUTO: 15.1 10E3/UL (ref 4–11)
WBC # BLD AUTO: 18.3 10E3/UL (ref 4–11)
WBC # FLD AUTO: 90 /UL

## 2023-01-01 PROCEDURE — 250N000011 HC RX IP 250 OP 636: Performed by: EMERGENCY MEDICINE

## 2023-01-01 PROCEDURE — 258N000003 HC RX IP 258 OP 636: Performed by: INTERNAL MEDICINE

## 2023-01-01 PROCEDURE — 250N000009 HC RX 250: Performed by: INTERNAL MEDICINE

## 2023-01-01 PROCEDURE — 272N000123 HC CATH CR9

## 2023-01-01 PROCEDURE — P9059 PLASMA, FRZ BETWEEN 8-24HOUR: HCPCS | Performed by: INTERNAL MEDICINE

## 2023-01-01 PROCEDURE — 94002 VENT MGMT INPAT INIT DAY: CPT

## 2023-01-01 PROCEDURE — 258N000003 HC RX IP 258 OP 636: Performed by: EMERGENCY MEDICINE

## 2023-01-01 PROCEDURE — 82248 BILIRUBIN DIRECT: CPT | Performed by: INTERNAL MEDICINE

## 2023-01-01 PROCEDURE — 80053 COMPREHEN METABOLIC PANEL: CPT | Performed by: INTERNAL MEDICINE

## 2023-01-01 PROCEDURE — 200N000001 HC R&B ICU

## 2023-01-01 PROCEDURE — 82805 BLOOD GASES W/O2 SATURATION: CPT | Performed by: INTERNAL MEDICINE

## 2023-01-01 PROCEDURE — P9047 ALBUMIN (HUMAN), 25%, 50ML: HCPCS | Performed by: INTERNAL MEDICINE

## 2023-01-01 PROCEDURE — 86850 RBC ANTIBODY SCREEN: CPT | Performed by: EMERGENCY MEDICINE

## 2023-01-01 PROCEDURE — C9113 INJ PANTOPRAZOLE SODIUM, VIA: HCPCS | Performed by: EMERGENCY MEDICINE

## 2023-01-01 PROCEDURE — 250N000009 HC RX 250

## 2023-01-01 PROCEDURE — 250N000011 HC RX IP 250 OP 636: Performed by: INTERNAL MEDICINE

## 2023-01-01 PROCEDURE — 999N000009 HC STATISTIC AIRWAY CARE

## 2023-01-01 PROCEDURE — 99285 EMERGENCY DEPT VISIT HI MDM: CPT | Mod: 25

## 2023-01-01 PROCEDURE — 85018 HEMOGLOBIN: CPT | Performed by: INTERNAL MEDICINE

## 2023-01-01 PROCEDURE — 70450 CT HEAD/BRAIN W/O DYE: CPT

## 2023-01-01 PROCEDURE — 83690 ASSAY OF LIPASE: CPT | Performed by: INTERNAL MEDICINE

## 2023-01-01 PROCEDURE — 72131 CT LUMBAR SPINE W/O DYE: CPT

## 2023-01-01 PROCEDURE — 999N000287 HC ICU ADULT ROUNDING, EACH 10 MINS

## 2023-01-01 PROCEDURE — C1769 GUIDE WIRE: HCPCS

## 2023-01-01 PROCEDURE — 84100 ASSAY OF PHOSPHORUS: CPT | Performed by: INTERNAL MEDICINE

## 2023-01-01 PROCEDURE — 99222 1ST HOSP IP/OBS MODERATE 55: CPT | Performed by: INTERNAL MEDICINE

## 2023-01-01 PROCEDURE — P9012 CRYOPRECIPITATE EACH UNIT: HCPCS | Performed by: INTERNAL MEDICINE

## 2023-01-01 PROCEDURE — 999N000055 HC STATISTIC END TITIAL CO2 MONITORING

## 2023-01-01 PROCEDURE — P9059 PLASMA, FRZ BETWEEN 8-24HOUR: HCPCS | Performed by: EMERGENCY MEDICINE

## 2023-01-01 PROCEDURE — 85025 COMPLETE CBC W/AUTO DIFF WBC: CPT | Performed by: EMERGENCY MEDICINE

## 2023-01-01 PROCEDURE — 86880 COOMBS TEST DIRECT: CPT | Performed by: INTERNAL MEDICINE

## 2023-01-01 PROCEDURE — P9037 PLATE PHERES LEUKOREDU IRRAD: HCPCS | Performed by: INTERNAL MEDICINE

## 2023-01-01 PROCEDURE — 85610 PROTHROMBIN TIME: CPT | Performed by: INTERNAL MEDICINE

## 2023-01-01 PROCEDURE — C1725 CATH, TRANSLUMIN NON-LASER: HCPCS

## 2023-01-01 PROCEDURE — 06L38CZ OCCLUSION OF ESOPHAGEAL VEIN WITH EXTRALUMINAL DEVICE, VIA NATURAL OR ARTIFICIAL OPENING ENDOSCOPIC: ICD-10-PCS | Performed by: INTERNAL MEDICINE

## 2023-01-01 PROCEDURE — 272N000121 HC CATH CR6

## 2023-01-01 PROCEDURE — 86923 COMPATIBILITY TEST ELECTRIC: CPT | Performed by: INTERNAL MEDICINE

## 2023-01-01 PROCEDURE — 250N000013 HC RX MED GY IP 250 OP 250 PS 637: Performed by: INTERNAL MEDICINE

## 2023-01-01 PROCEDURE — 85610 PROTHROMBIN TIME: CPT | Performed by: EMERGENCY MEDICINE

## 2023-01-01 PROCEDURE — 36415 COLL VENOUS BLD VENIPUNCTURE: CPT | Performed by: EMERGENCY MEDICINE

## 2023-01-01 PROCEDURE — 250N000009 HC RX 250: Performed by: EMERGENCY MEDICINE

## 2023-01-01 PROCEDURE — 84155 ASSAY OF PROTEIN SERUM: CPT | Performed by: INTERNAL MEDICINE

## 2023-01-01 PROCEDURE — P9016 RBC LEUKOCYTES REDUCED: HCPCS | Performed by: EMERGENCY MEDICINE

## 2023-01-01 PROCEDURE — 94003 VENT MGMT INPAT SUBQ DAY: CPT

## 2023-01-01 PROCEDURE — 258N000001 HC RX 258: Performed by: INTERNAL MEDICINE

## 2023-01-01 PROCEDURE — 96361 HYDRATE IV INFUSION ADD-ON: CPT

## 2023-01-01 PROCEDURE — P9045 ALBUMIN (HUMAN), 5%, 250 ML: HCPCS | Performed by: INTERNAL MEDICINE

## 2023-01-01 PROCEDURE — P9016 RBC LEUKOCYTES REDUCED: HCPCS | Performed by: INTERNAL MEDICINE

## 2023-01-01 PROCEDURE — 85007 BL SMEAR W/DIFF WBC COUNT: CPT | Performed by: INTERNAL MEDICINE

## 2023-01-01 PROCEDURE — 272N000272 HC CONTINUOUS NEBULIZER MICRO PUMP

## 2023-01-01 PROCEDURE — 96375 TX/PRO/DX INJ NEW DRUG ADDON: CPT

## 2023-01-01 PROCEDURE — 82042 OTHER SOURCE ALBUMIN QUAN EA: CPT | Performed by: INTERNAL MEDICINE

## 2023-01-01 PROCEDURE — 82248 BILIRUBIN DIRECT: CPT | Performed by: EMERGENCY MEDICINE

## 2023-01-01 PROCEDURE — 2894A VOIDCORRECT: CPT | Mod: 25 | Performed by: INTERNAL MEDICINE

## 2023-01-01 PROCEDURE — 85027 COMPLETE CBC AUTOMATED: CPT | Performed by: INTERNAL MEDICINE

## 2023-01-01 PROCEDURE — 87205 SMEAR GRAM STAIN: CPT | Performed by: INTERNAL MEDICINE

## 2023-01-01 PROCEDURE — 99239 HOSP IP/OBS DSCHRG MGMT >30: CPT | Performed by: INTERNAL MEDICINE

## 2023-01-01 PROCEDURE — 82040 ASSAY OF SERUM ALBUMIN: CPT | Performed by: INTERNAL MEDICINE

## 2023-01-01 PROCEDURE — 99291 CRITICAL CARE FIRST HOUR: CPT | Performed by: INTERNAL MEDICINE

## 2023-01-01 PROCEDURE — 5A1945Z RESPIRATORY VENTILATION, 24-96 CONSECUTIVE HOURS: ICD-10-PCS | Performed by: EMERGENCY MEDICINE

## 2023-01-01 PROCEDURE — 85384 FIBRINOGEN ACTIVITY: CPT | Performed by: INTERNAL MEDICINE

## 2023-01-01 PROCEDURE — 96368 THER/DIAG CONCURRENT INF: CPT

## 2023-01-01 PROCEDURE — 272N000302 HC DEVICE INFLATION CR5

## 2023-01-01 PROCEDURE — 83605 ASSAY OF LACTIC ACID: CPT | Performed by: INTERNAL MEDICINE

## 2023-01-01 PROCEDURE — C1874 STENT, COATED/COV W/DEL SYS: HCPCS

## 2023-01-01 PROCEDURE — 82330 ASSAY OF CALCIUM: CPT | Performed by: INTERNAL MEDICINE

## 2023-01-01 PROCEDURE — 36600 WITHDRAWAL OF ARTERIAL BLOOD: CPT

## 2023-01-01 PROCEDURE — 272N000452 HC KIT SHRLOCK 5FR POWER PICC TRIPLE LUMEN

## 2023-01-01 PROCEDURE — 86901 BLOOD TYPING SEROLOGIC RH(D): CPT | Performed by: EMERGENCY MEDICINE

## 2023-01-01 PROCEDURE — 74177 CT ABD & PELVIS W/CONTRAST: CPT

## 2023-01-01 PROCEDURE — 87070 CULTURE OTHR SPECIMN AEROBIC: CPT | Performed by: INTERNAL MEDICINE

## 2023-01-01 PROCEDURE — 84157 ASSAY OF PROTEIN OTHER: CPT | Performed by: INTERNAL MEDICINE

## 2023-01-01 PROCEDURE — 96366 THER/PROPH/DIAG IV INF ADDON: CPT | Mod: 59

## 2023-01-01 PROCEDURE — 85049 AUTOMATED PLATELET COUNT: CPT | Performed by: INTERNAL MEDICINE

## 2023-01-01 PROCEDURE — 82310 ASSAY OF CALCIUM: CPT | Performed by: EMERGENCY MEDICINE

## 2023-01-01 PROCEDURE — 999N000157 HC STATISTIC RCP TIME EA 10 MIN

## 2023-01-01 PROCEDURE — P9047 ALBUMIN (HUMAN), 25%, 50ML: HCPCS

## 2023-01-01 PROCEDURE — 94640 AIRWAY INHALATION TREATMENT: CPT

## 2023-01-01 PROCEDURE — 87040 BLOOD CULTURE FOR BACTERIA: CPT | Performed by: EMERGENCY MEDICINE

## 2023-01-01 PROCEDURE — 999N000065 XR CHEST PORT 1 VIEW

## 2023-01-01 PROCEDURE — 82077 ASSAY SPEC XCP UR&BREATH IA: CPT | Performed by: EMERGENCY MEDICINE

## 2023-01-01 PROCEDURE — 36556 INSERT NON-TUNNEL CV CATH: CPT | Performed by: INTERNAL MEDICINE

## 2023-01-01 PROCEDURE — 37182 INSERT HEPATIC SHUNT (TIPS): CPT

## 2023-01-01 PROCEDURE — 96365 THER/PROPH/DIAG IV INF INIT: CPT | Mod: 59

## 2023-01-01 PROCEDURE — 258N000003 HC RX IP 258 OP 636: Performed by: NURSE ANESTHETIST, CERTIFIED REGISTERED

## 2023-01-01 PROCEDURE — 82550 ASSAY OF CK (CPK): CPT | Performed by: INTERNAL MEDICINE

## 2023-01-01 PROCEDURE — 3E043XZ INTRODUCTION OF VASOPRESSOR INTO CENTRAL VEIN, PERCUTANEOUS APPROACH: ICD-10-PCS | Performed by: INTERNAL MEDICINE

## 2023-01-01 PROCEDURE — P9035 PLATELET PHERES LEUKOREDUCED: HCPCS | Performed by: EMERGENCY MEDICINE

## 2023-01-01 PROCEDURE — 82140 ASSAY OF AMMONIA: CPT | Performed by: EMERGENCY MEDICINE

## 2023-01-01 PROCEDURE — 83735 ASSAY OF MAGNESIUM: CPT | Performed by: INTERNAL MEDICINE

## 2023-01-01 PROCEDURE — 272N000497 HC NEEDLE CR16

## 2023-01-01 PROCEDURE — 999N000105 HC STATISTIC NO DOCUMENTATION TO SUPPORT CHARGE

## 2023-01-01 PROCEDURE — 36569 INSJ PICC 5 YR+ W/O IMAGING: CPT

## 2023-01-01 PROCEDURE — 82140 ASSAY OF AMMONIA: CPT | Performed by: INTERNAL MEDICINE

## 2023-01-01 PROCEDURE — 5A1D90Z PERFORMANCE OF URINARY FILTRATION, CONTINUOUS, GREATER THAN 18 HOURS PER DAY: ICD-10-PCS | Performed by: INTERNAL MEDICINE

## 2023-01-01 PROCEDURE — 84460 ALANINE AMINO (ALT) (SGPT): CPT | Performed by: INTERNAL MEDICINE

## 2023-01-01 PROCEDURE — 999N000185 HC STATISTIC TRANSPORT TIME EA 15 MIN

## 2023-01-01 PROCEDURE — 99233 SBSQ HOSP IP/OBS HIGH 50: CPT | Performed by: INTERNAL MEDICINE

## 2023-01-01 PROCEDURE — 999N000259 HC STATISTIC EXTUBATION

## 2023-01-01 PROCEDURE — 71045 X-RAY EXAM CHEST 1 VIEW: CPT

## 2023-01-01 PROCEDURE — 80069 RENAL FUNCTION PANEL: CPT | Performed by: INTERNAL MEDICINE

## 2023-01-01 PROCEDURE — 250N000009 HC RX 250: Performed by: NURSE ANESTHETIST, CERTIFIED REGISTERED

## 2023-01-01 PROCEDURE — 84075 ASSAY ALKALINE PHOSPHATASE: CPT | Performed by: INTERNAL MEDICINE

## 2023-01-01 PROCEDURE — C1729 CATH, DRAINAGE: HCPCS

## 2023-01-01 PROCEDURE — 96365 THER/PROPH/DIAG IV INF INIT: CPT

## 2023-01-01 PROCEDURE — 89051 BODY FLUID CELL COUNT: CPT | Performed by: INTERNAL MEDICINE

## 2023-01-01 PROCEDURE — 85730 THROMBOPLASTIN TIME PARTIAL: CPT | Performed by: INTERNAL MEDICINE

## 2023-01-01 PROCEDURE — 06183J4 BYPASS PORTAL VEIN TO HEPATIC VEIN WITH SYNTHETIC SUBSTITUTE, PERCUTANEOUS APPROACH: ICD-10-PCS | Performed by: RADIOLOGY

## 2023-01-01 PROCEDURE — 258N000003 HC RX IP 258 OP 636: Performed by: STUDENT IN AN ORGANIZED HEALTH CARE EDUCATION/TRAINING PROGRAM

## 2023-01-01 PROCEDURE — 43235 EGD DIAGNOSTIC BRUSH WASH: CPT | Performed by: INTERNAL MEDICINE

## 2023-01-01 PROCEDURE — 86923 COMPATIBILITY TEST ELECTRIC: CPT | Performed by: EMERGENCY MEDICINE

## 2023-01-01 PROCEDURE — 94645 CONT INHLJ TX EACH ADDL HOUR: CPT

## 2023-01-01 PROCEDURE — 0W9G3ZX DRAINAGE OF PERITONEAL CAVITY, PERCUTANEOUS APPROACH, DIAGNOSTIC: ICD-10-PCS | Performed by: RADIOLOGY

## 2023-01-01 PROCEDURE — 36415 COLL VENOUS BLD VENIPUNCTURE: CPT | Performed by: INTERNAL MEDICINE

## 2023-01-01 PROCEDURE — 250N000011 HC RX IP 250 OP 636

## 2023-01-01 PROCEDURE — 31500 INSERT EMERGENCY AIRWAY: CPT

## 2023-01-01 PROCEDURE — 99292 CRITICAL CARE ADDL 30 MIN: CPT | Mod: 25 | Performed by: INTERNAL MEDICINE

## 2023-01-01 PROCEDURE — 96376 TX/PRO/DX INJ SAME DRUG ADON: CPT

## 2023-01-01 PROCEDURE — 255N000002 HC RX 255 OP 636: Performed by: RADIOLOGY

## 2023-01-01 PROCEDURE — 272N000199 HC ACCESSORY CR8

## 2023-01-01 PROCEDURE — 94644 CONT INHLJ TX 1ST HOUR: CPT

## 2023-01-01 PROCEDURE — 272N000500 HC NEEDLE CR2

## 2023-01-01 PROCEDURE — 82247 BILIRUBIN TOTAL: CPT | Performed by: INTERNAL MEDICINE

## 2023-01-01 PROCEDURE — P9035 PLATELET PHERES LEUKOREDUCED: HCPCS | Performed by: INTERNAL MEDICINE

## 2023-01-01 PROCEDURE — 30233M1 TRANSFUSION OF NONAUTOLOGOUS PLASMA CRYOPRECIPITATE INTO PERIPHERAL VEIN, PERCUTANEOUS APPROACH: ICD-10-PCS | Performed by: INTERNAL MEDICINE

## 2023-01-01 RX ORDER — LIDOCAINE 40 MG/G
CREAM TOPICAL
Status: DISCONTINUED | OUTPATIENT
Start: 2023-01-01 | End: 2023-01-01 | Stop reason: HOSPADM

## 2023-01-01 RX ORDER — CALCIUM CHLORIDE 100 MG/ML
1 INJECTION INTRAVENOUS; INTRAVENTRICULAR ONCE
Status: COMPLETED | OUTPATIENT
Start: 2023-01-01 | End: 2023-01-01

## 2023-01-01 RX ORDER — MULTIPLE VITAMINS W/ MINERALS TAB 9MG-400MCG
1 TAB ORAL DAILY
COMMUNITY

## 2023-01-01 RX ORDER — FENTANYL CITRATE 50 UG/ML
100 INJECTION, SOLUTION INTRAMUSCULAR; INTRAVENOUS
Status: DISCONTINUED | OUTPATIENT
Start: 2023-01-01 | End: 2023-01-01 | Stop reason: HOSPADM

## 2023-01-01 RX ORDER — PIPERACILLIN SODIUM, TAZOBACTAM SODIUM 3; .375 G/15ML; G/15ML
3.38 INJECTION, POWDER, LYOPHILIZED, FOR SOLUTION INTRAVENOUS EVERY 8 HOURS
Status: DISCONTINUED | OUTPATIENT
Start: 2023-01-01 | End: 2023-01-01 | Stop reason: HOSPADM

## 2023-01-01 RX ORDER — CEFTRIAXONE 2 G/1
2 INJECTION, POWDER, FOR SOLUTION INTRAMUSCULAR; INTRAVENOUS ONCE
Status: COMPLETED | OUTPATIENT
Start: 2023-01-01 | End: 2023-01-01

## 2023-01-01 RX ORDER — CALCIUM CHLORIDE, MAGNESIUM CHLORIDE, SODIUM CHLORIDE, SODIUM BICARBONATE, POTASSIUM CHLORIDE AND SODIUM PHOSPHATE DIBASIC DIHYDRATE 3.68; 3.05; 6.34; 3.09; .314; .187 G/L; G/L; G/L; G/L; G/L; G/L
12.5 INJECTION INTRAVENOUS CONTINUOUS
Status: DISCONTINUED | OUTPATIENT
Start: 2023-01-01 | End: 2023-01-01

## 2023-01-01 RX ORDER — METHOCARBAMOL 750 MG/1
750 TABLET, FILM COATED ORAL 4 TIMES DAILY PRN
COMMUNITY

## 2023-01-01 RX ORDER — ALBUMIN (HUMAN) 12.5 G/50ML
25 SOLUTION INTRAVENOUS ONCE
Status: COMPLETED | OUTPATIENT
Start: 2023-01-01 | End: 2023-01-01

## 2023-01-01 RX ORDER — NICOTINE POLACRILEX 4 MG
15-30 LOZENGE BUCCAL
Status: DISCONTINUED | OUTPATIENT
Start: 2023-01-01 | End: 2023-01-01 | Stop reason: HOSPADM

## 2023-01-01 RX ORDER — IOPAMIDOL 755 MG/ML
100 INJECTION, SOLUTION INTRAVASCULAR ONCE
Status: COMPLETED | OUTPATIENT
Start: 2023-01-01 | End: 2023-01-01

## 2023-01-01 RX ORDER — METOPROLOL TARTRATE 1 MG/ML
INJECTION, SOLUTION INTRAVENOUS
Status: COMPLETED
Start: 2023-01-01 | End: 2023-01-01

## 2023-01-01 RX ORDER — PHENYLEPHRINE HCL IN 0.9% NACL 50MG/250ML
.1-6 PLASTIC BAG, INJECTION (ML) INTRAVENOUS CONTINUOUS
Status: DISCONTINUED | OUTPATIENT
Start: 2023-01-01 | End: 2023-01-01 | Stop reason: DRUGHIGH

## 2023-01-01 RX ORDER — DEXMEDETOMIDINE HYDROCHLORIDE 4 UG/ML
.1-1.2 INJECTION, SOLUTION INTRAVENOUS CONTINUOUS
Status: DISCONTINUED | OUTPATIENT
Start: 2023-01-01 | End: 2023-01-01 | Stop reason: HOSPADM

## 2023-01-01 RX ORDER — LIDOCAINE 40 MG/G
CREAM TOPICAL
Status: DISCONTINUED | OUTPATIENT
Start: 2023-01-01 | End: 2023-01-01

## 2023-01-01 RX ORDER — SODIUM CHLORIDE 9 MG/ML
INJECTION, SOLUTION INTRAVENOUS CONTINUOUS PRN
Status: DISCONTINUED | OUTPATIENT
Start: 2023-01-01 | End: 2023-01-01

## 2023-01-01 RX ORDER — CALCIUM CHLORIDE 100 MG/ML
INJECTION INTRAVENOUS; INTRAVENTRICULAR
Status: COMPLETED
Start: 2023-01-01 | End: 2023-01-01

## 2023-01-01 RX ORDER — ONDANSETRON 2 MG/ML
8 INJECTION INTRAMUSCULAR; INTRAVENOUS ONCE
Status: COMPLETED | OUTPATIENT
Start: 2023-01-01 | End: 2023-01-01

## 2023-01-01 RX ORDER — CALCIUM CHLORIDE, MAGNESIUM CHLORIDE, DEXTROSE MONOHYDRATE, LACTIC ACID, SODIUM CHLORIDE, SODIUM BICARBONATE AND POTASSIUM CHLORIDE 5.15; 2.03; 22; 5.4; 6.46; 3.09; .157 G/L; G/L; G/L; G/L; G/L; G/L; G/L
200 INJECTION INTRAVENOUS CONTINUOUS
Status: DISCONTINUED | OUTPATIENT
Start: 2023-01-01 | End: 2023-01-01 | Stop reason: HOSPADM

## 2023-01-01 RX ORDER — NOREPINEPHRINE BITARTRATE 0.02 MG/ML
.01-.6 INJECTION, SOLUTION INTRAVENOUS CONTINUOUS
Status: DISCONTINUED | OUTPATIENT
Start: 2023-01-01 | End: 2023-01-01

## 2023-01-01 RX ORDER — MAGNESIUM SULFATE HEPTAHYDRATE 40 MG/ML
2 INJECTION, SOLUTION INTRAVENOUS ONCE
Status: COMPLETED | OUTPATIENT
Start: 2023-01-01 | End: 2023-01-01

## 2023-01-01 RX ORDER — DEXTROSE MONOHYDRATE 25 G/50ML
25-50 INJECTION, SOLUTION INTRAVENOUS
Status: DISCONTINUED | OUTPATIENT
Start: 2023-01-01 | End: 2023-01-01

## 2023-01-01 RX ORDER — CALCIUM GLUCONATE 20 MG/ML
1 INJECTION, SOLUTION INTRAVENOUS ONCE
Status: COMPLETED | OUTPATIENT
Start: 2023-01-01 | End: 2023-01-01

## 2023-01-01 RX ORDER — VANCOMYCIN HYDROCHLORIDE 1 G/200ML
1000 INJECTION, SOLUTION INTRAVENOUS EVERY 12 HOURS
Status: DISCONTINUED | OUTPATIENT
Start: 2023-01-01 | End: 2023-01-01 | Stop reason: HOSPADM

## 2023-01-01 RX ORDER — CHLORHEXIDINE GLUCONATE ORAL RINSE 1.2 MG/ML
15 SOLUTION DENTAL EVERY 12 HOURS
Status: DISCONTINUED | OUTPATIENT
Start: 2023-01-01 | End: 2023-01-01 | Stop reason: HOSPADM

## 2023-01-01 RX ORDER — ALBUTEROL SULFATE 90 UG/1
2 AEROSOL, METERED RESPIRATORY (INHALATION) EVERY 4 HOURS PRN
COMMUNITY

## 2023-01-01 RX ORDER — NICOTINE POLACRILEX 4 MG
15-30 LOZENGE BUCCAL
Status: DISCONTINUED | OUTPATIENT
Start: 2023-01-01 | End: 2023-01-01

## 2023-01-01 RX ORDER — CEFTRIAXONE 1 G/1
1 INJECTION, POWDER, FOR SOLUTION INTRAMUSCULAR; INTRAVENOUS EVERY 24 HOURS
Status: DISCONTINUED | OUTPATIENT
Start: 2023-01-01 | End: 2023-01-01

## 2023-01-01 RX ORDER — SODIUM CHLORIDE 9 MG/ML
INJECTION, SOLUTION INTRAVENOUS CONTINUOUS
Status: DISCONTINUED | OUTPATIENT
Start: 2023-01-01 | End: 2023-01-01

## 2023-01-01 RX ORDER — HYDROMORPHONE HYDROCHLORIDE 1 MG/ML
0.3 INJECTION, SOLUTION INTRAMUSCULAR; INTRAVENOUS; SUBCUTANEOUS ONCE
Status: COMPLETED | OUTPATIENT
Start: 2023-01-01 | End: 2023-01-01

## 2023-01-01 RX ORDER — OCTREOTIDE ACETATE 50 UG/ML
50 INJECTION, SOLUTION INTRAVENOUS; SUBCUTANEOUS ONCE
Status: COMPLETED | OUTPATIENT
Start: 2023-01-01 | End: 2023-01-01

## 2023-01-01 RX ORDER — CEFAZOLIN SODIUM 1 G/50ML
25 SOLUTION INTRAVENOUS ONCE
Status: COMPLETED | OUTPATIENT
Start: 2023-01-01 | End: 2023-01-01

## 2023-01-01 RX ORDER — DEXTROSE 20 G/100ML
INJECTION, SOLUTION INTRAVENOUS CONTINUOUS
Status: DISCONTINUED | OUTPATIENT
Start: 2023-01-01 | End: 2023-01-01 | Stop reason: HOSPADM

## 2023-01-01 RX ORDER — CALCIUM GLUCONATE 20 MG/ML
2 INJECTION, SOLUTION INTRAVENOUS ONCE
Status: COMPLETED | OUTPATIENT
Start: 2023-01-01 | End: 2023-01-01

## 2023-01-01 RX ORDER — DEXTROSE MONOHYDRATE 25 G/50ML
25-50 INJECTION, SOLUTION INTRAVENOUS
Status: DISCONTINUED | OUTPATIENT
Start: 2023-01-01 | End: 2023-01-01 | Stop reason: HOSPADM

## 2023-01-01 RX ORDER — IPRATROPIUM BROMIDE AND ALBUTEROL SULFATE 2.5; .5 MG/3ML; MG/3ML
3 SOLUTION RESPIRATORY (INHALATION)
Status: DISCONTINUED | OUTPATIENT
Start: 2023-01-01 | End: 2023-01-01 | Stop reason: HOSPADM

## 2023-01-01 RX ORDER — ALBUMIN (HUMAN) 12.5 G/50ML
SOLUTION INTRAVENOUS
Status: COMPLETED
Start: 2023-01-01 | End: 2023-01-01

## 2023-01-01 RX ORDER — PROPOFOL 10 MG/ML
5-75 INJECTION, EMULSION INTRAVENOUS CONTINUOUS
Status: DISCONTINUED | OUTPATIENT
Start: 2023-01-01 | End: 2023-01-01 | Stop reason: HOSPADM

## 2023-01-01 RX ORDER — POTASSIUM CHLORIDE 29.8 MG/ML
20 INJECTION INTRAVENOUS EVERY 8 HOURS PRN
Status: DISCONTINUED | OUTPATIENT
Start: 2023-01-01 | End: 2023-01-01 | Stop reason: HOSPADM

## 2023-01-01 RX ORDER — CALCIUM CHLORIDE, MAGNESIUM CHLORIDE, SODIUM CHLORIDE, SODIUM BICARBONATE, POTASSIUM CHLORIDE AND SODIUM PHOSPHATE DIBASIC DIHYDRATE 3.68; 3.05; 6.34; 3.09; .314; .187 G/L; G/L; G/L; G/L; G/L; G/L
200 INJECTION INTRAVENOUS CONTINUOUS
Status: DISCONTINUED | OUTPATIENT
Start: 2023-01-01 | End: 2023-01-01

## 2023-01-01 RX ORDER — CALCIUM CHLORIDE, MAGNESIUM CHLORIDE, DEXTROSE MONOHYDRATE, LACTIC ACID, SODIUM CHLORIDE, SODIUM BICARBONATE AND POTASSIUM CHLORIDE 5.15; 2.03; 22; 5.4; 6.46; 3.09; .157 G/L; G/L; G/L; G/L; G/L; G/L; G/L
12.5 INJECTION INTRAVENOUS CONTINUOUS
Status: DISCONTINUED | OUTPATIENT
Start: 2023-01-01 | End: 2023-01-01 | Stop reason: HOSPADM

## 2023-01-01 RX ORDER — PIPERACILLIN SODIUM, TAZOBACTAM SODIUM 3; .375 G/15ML; G/15ML
3.38 INJECTION, POWDER, LYOPHILIZED, FOR SOLUTION INTRAVENOUS ONCE
Status: COMPLETED | OUTPATIENT
Start: 2023-01-01 | End: 2023-01-01

## 2023-01-01 RX ORDER — ETOMIDATE 2 MG/ML
0.3 INJECTION INTRAVENOUS ONCE
Status: COMPLETED | OUTPATIENT
Start: 2023-01-01 | End: 2023-01-01

## 2023-01-01 RX ORDER — PIPERACILLIN SODIUM, TAZOBACTAM SODIUM 3; .375 G/15ML; G/15ML
3.38 INJECTION, POWDER, LYOPHILIZED, FOR SOLUTION INTRAVENOUS EVERY 8 HOURS
Status: DISCONTINUED | OUTPATIENT
Start: 2023-01-01 | End: 2023-01-01

## 2023-01-01 RX ORDER — CALCIUM GLUCONATE 20 MG/ML
2 INJECTION, SOLUTION INTRAVENOUS EVERY 8 HOURS PRN
Status: DISCONTINUED | OUTPATIENT
Start: 2023-01-01 | End: 2023-01-01 | Stop reason: HOSPADM

## 2023-01-01 RX ORDER — DEXTROSE MONOHYDRATE 100 MG/ML
INJECTION, SOLUTION INTRAVENOUS CONTINUOUS
Status: DISCONTINUED | OUTPATIENT
Start: 2023-01-01 | End: 2023-01-01 | Stop reason: DRUGHIGH

## 2023-01-01 RX ORDER — LORAZEPAM 2 MG/ML
1 INJECTION INTRAMUSCULAR EVERY 6 HOURS
Status: DISCONTINUED | OUTPATIENT
Start: 2023-01-01 | End: 2023-01-01 | Stop reason: HOSPADM

## 2023-01-01 RX ORDER — MAGNESIUM SULFATE HEPTAHYDRATE 40 MG/ML
2 INJECTION, SOLUTION INTRAVENOUS EVERY 8 HOURS PRN
Status: DISCONTINUED | OUTPATIENT
Start: 2023-01-01 | End: 2023-01-01 | Stop reason: HOSPADM

## 2023-01-01 RX ORDER — IBUPROFEN 400 MG/1
800 TABLET, FILM COATED ORAL EVERY 8 HOURS PRN
COMMUNITY

## 2023-01-01 RX ADMIN — VANCOMYCIN HYDROCHLORIDE 1000 MG: 1 INJECTION, SOLUTION INTRAVENOUS at 00:30

## 2023-01-01 RX ADMIN — CEFTRIAXONE SODIUM 1 G: 1 INJECTION, POWDER, FOR SOLUTION INTRAMUSCULAR; INTRAVENOUS at 21:55

## 2023-01-01 RX ADMIN — CALCIUM CHLORIDE 1 G: 100 INJECTION INTRAVENOUS; INTRAVENTRICULAR at 04:38

## 2023-01-01 RX ADMIN — MINERAL OIL AND PETROLATUM: 150; 830 OINTMENT OPHTHALMIC at 08:11

## 2023-01-01 RX ADMIN — SODIUM BICARBONATE 75 ML/HR: 84 INJECTION, SOLUTION INTRAVENOUS at 16:26

## 2023-01-01 RX ADMIN — SODIUM BICARBONATE 50 MEQ: 84 INJECTION, SOLUTION INTRAVENOUS at 18:52

## 2023-01-01 RX ADMIN — MAGNESIUM SULFATE HEPTAHYDRATE 2 G: 40 INJECTION, SOLUTION INTRAVENOUS at 21:20

## 2023-01-01 RX ADMIN — CALCIUM CHLORIDE, MAGNESIUM CHLORIDE, DEXTROSE MONOHYDRATE, LACTIC ACID, SODIUM CHLORIDE, SODIUM BICARBONATE AND POTASSIUM CHLORIDE 12.5 ML/KG/HR: 5.15; 2.03; 22; 5.4; 6.46; 3.09; .157 INJECTION INTRAVENOUS at 07:05

## 2023-01-01 RX ADMIN — SODIUM CHLORIDE 1000 ML: 9 INJECTION, SOLUTION INTRAVENOUS at 18:54

## 2023-01-01 RX ADMIN — PHENYLEPHRINE HYDROCHLORIDE 6 MCG/KG/MIN: 50 INJECTION INTRAVENOUS at 22:41

## 2023-01-01 RX ADMIN — CALCIUM CHLORIDE, MAGNESIUM CHLORIDE, SODIUM CHLORIDE, SODIUM BICARBONATE, POTASSIUM CHLORIDE AND SODIUM PHOSPHATE DIBASIC DIHYDRATE 12.5 ML/KG/HR: 3.68; 3.05; 6.34; 3.09; .314; .187 INJECTION INTRAVENOUS at 08:18

## 2023-01-01 RX ADMIN — LORAZEPAM 1 MG: 2 INJECTION INTRAMUSCULAR at 13:58

## 2023-01-01 RX ADMIN — PHYTONADIONE 10 MG: 10 INJECTION, EMULSION INTRAMUSCULAR; INTRAVENOUS; SUBCUTANEOUS at 16:16

## 2023-01-01 RX ADMIN — ALBUMIN HUMAN 25 G: 0.25 SOLUTION INTRAVENOUS at 04:21

## 2023-01-01 RX ADMIN — PIPERACILLIN AND TAZOBACTAM 3.38 G: 3; .375 INJECTION, POWDER, LYOPHILIZED, FOR SOLUTION INTRAVENOUS at 12:40

## 2023-01-01 RX ADMIN — SODIUM BICARBONATE 25 ML/HR: 84 INJECTION, SOLUTION INTRAVENOUS at 05:00

## 2023-01-01 RX ADMIN — CALCIUM CHLORIDE, MAGNESIUM CHLORIDE, SODIUM CHLORIDE, SODIUM BICARBONATE, POTASSIUM CHLORIDE AND SODIUM PHOSPHATE DIBASIC DIHYDRATE 12.5 ML/KG/HR: 3.68; 3.05; 6.34; 3.09; .314; .187 INJECTION INTRAVENOUS at 08:20

## 2023-01-01 RX ADMIN — Medication 50 MCG/HR: at 18:48

## 2023-01-01 RX ADMIN — CALCIUM CHLORIDE, MAGNESIUM CHLORIDE, SODIUM CHLORIDE, SODIUM BICARBONATE, POTASSIUM CHLORIDE AND SODIUM PHOSPHATE DIBASIC DIHYDRATE 12.5 ML/KG/HR: 3.68; 3.05; 6.34; 3.09; .314; .187 INJECTION INTRAVENOUS at 12:16

## 2023-01-01 RX ADMIN — ROCURONIUM BROMIDE 30 MG: 50 INJECTION, SOLUTION INTRAVENOUS at 19:43

## 2023-01-01 RX ADMIN — IOPAMIDOL 100 ML: 755 INJECTION, SOLUTION INTRAVENOUS at 02:19

## 2023-01-01 RX ADMIN — SODIUM BICARBONATE 75 ML/HR: 84 INJECTION, SOLUTION INTRAVENOUS at 07:09

## 2023-01-01 RX ADMIN — DEXMEDETOMIDINE HYDROCHLORIDE 0.6 MCG/KG/HR: 400 INJECTION INTRAVENOUS at 15:43

## 2023-01-01 RX ADMIN — PANTOPRAZOLE SODIUM 40 MG: 40 INJECTION, POWDER, FOR SOLUTION INTRAVENOUS at 22:01

## 2023-01-01 RX ADMIN — LORAZEPAM 1 MG: 2 INJECTION INTRAMUSCULAR at 07:46

## 2023-01-01 RX ADMIN — CALCIUM CHLORIDE, MAGNESIUM CHLORIDE, DEXTROSE MONOHYDRATE, LACTIC ACID, SODIUM CHLORIDE, SODIUM BICARBONATE AND POTASSIUM CHLORIDE 12.5 ML/KG/HR: 5.15; 2.03; 22; 5.4; 6.46; 3.09; .157 INJECTION INTRAVENOUS at 23:37

## 2023-01-01 RX ADMIN — CALCIUM CHLORIDE 1 G: 100 INJECTION INTRAVENOUS; INTRAVENTRICULAR at 06:11

## 2023-01-01 RX ADMIN — Medication 6 MCG/KG/MIN: at 17:00

## 2023-01-01 RX ADMIN — ETOMIDATE 28.6 MG: 2 INJECTION INTRAVENOUS at 02:53

## 2023-01-01 RX ADMIN — PHYTONADIONE 10 MG: 10 INJECTION, EMULSION INTRAMUSCULAR; INTRAVENOUS; SUBCUTANEOUS at 07:41

## 2023-01-01 RX ADMIN — MINERAL OIL AND PETROLATUM: 150; 830 OINTMENT OPHTHALMIC at 04:21

## 2023-01-01 RX ADMIN — NOREPINEPHRINE BITARTRATE 0.6 MCG/KG/MIN: 1 SOLUTION INTRAVENOUS at 18:41

## 2023-01-01 RX ADMIN — CALCIUM GLUCONATE 1 G: 20 INJECTION, SOLUTION INTRAVENOUS at 06:08

## 2023-01-01 RX ADMIN — CALCIUM CHLORIDE, MAGNESIUM CHLORIDE, SODIUM CHLORIDE, SODIUM BICARBONATE, POTASSIUM CHLORIDE AND SODIUM PHOSPHATE DIBASIC DIHYDRATE 12.5 ML/KG/HR: 3.68; 3.05; 6.34; 3.09; .314; .187 INJECTION INTRAVENOUS at 00:32

## 2023-01-01 RX ADMIN — PROPOFOL 75 MCG/KG/MIN: 10 INJECTION, EMULSION INTRAVENOUS at 15:47

## 2023-01-01 RX ADMIN — ROCURONIUM BROMIDE 50 MG: 50 INJECTION, SOLUTION INTRAVENOUS at 19:11

## 2023-01-01 RX ADMIN — Medication 6 MCG/KG/MIN: at 19:59

## 2023-01-01 RX ADMIN — DEXTROSE MONOHYDRATE 50 ML: 25 INJECTION, SOLUTION INTRAVENOUS at 07:32

## 2023-01-01 RX ADMIN — Medication 25 MCG/HR: at 04:42

## 2023-01-01 RX ADMIN — PHENYLEPHRINE HYDROCHLORIDE 6 MCG/KG/MIN: 50 INJECTION INTRAVENOUS at 05:17

## 2023-01-01 RX ADMIN — VASOPRESSIN 2.4 UNITS/HR: 20 INJECTION, SOLUTION INTRAMUSCULAR; SUBCUTANEOUS at 22:45

## 2023-01-01 RX ADMIN — MIDAZOLAM HYDROCHLORIDE 2 MG: 1 INJECTION, SOLUTION INTRAMUSCULAR; INTRAVENOUS at 07:50

## 2023-01-01 RX ADMIN — HYDROCORTISONE SODIUM SUCCINATE 50 MG: 100 INJECTION, POWDER, FOR SOLUTION INTRAMUSCULAR; INTRAVENOUS at 05:42

## 2023-01-01 RX ADMIN — EPINEPHRINE 0.3 MCG/KG/MIN: 1 INJECTION INTRAMUSCULAR; INTRAVENOUS; SUBCUTANEOUS at 23:55

## 2023-01-01 RX ADMIN — DEXTROSE MONOHYDRATE 50 ML: 25 INJECTION, SOLUTION INTRAVENOUS at 07:41

## 2023-01-01 RX ADMIN — Medication 75 MCG/HR: at 15:43

## 2023-01-01 RX ADMIN — CALCIUM CHLORIDE 1 G: 100 INJECTION INTRAVENOUS; INTRAVENTRICULAR at 04:01

## 2023-01-01 RX ADMIN — CALCIUM GLUCONATE 4 G: 98 INJECTION, SOLUTION INTRAVENOUS at 15:18

## 2023-01-01 RX ADMIN — DEXMEDETOMIDINE HYDROCHLORIDE 0.6 MCG/KG/HR: 400 INJECTION INTRAVENOUS at 21:57

## 2023-01-01 RX ADMIN — CHLORHEXIDINE GLUCONATE 15 ML: 1.2 SOLUTION ORAL at 07:46

## 2023-01-01 RX ADMIN — CHLORHEXIDINE GLUCONATE 15 ML: 1.2 SOLUTION ORAL at 08:44

## 2023-01-01 RX ADMIN — NOREPINEPHRINE BITARTRATE 0.6 MCG/KG/MIN: 1 SOLUTION INTRAVENOUS at 08:40

## 2023-01-01 RX ADMIN — Medication 0.6 MCG/KG/MIN: at 07:28

## 2023-01-01 RX ADMIN — HYDROMORPHONE HYDROCHLORIDE 0.3 MG: 1 INJECTION, SOLUTION INTRAMUSCULAR; INTRAVENOUS; SUBCUTANEOUS at 00:59

## 2023-01-01 RX ADMIN — PANTOPRAZOLE SODIUM 8 MG/HR: 40 INJECTION, POWDER, FOR SOLUTION INTRAVENOUS at 09:33

## 2023-01-01 RX ADMIN — CALCIUM CHLORIDE, MAGNESIUM CHLORIDE, DEXTROSE MONOHYDRATE, LACTIC ACID, SODIUM CHLORIDE, SODIUM BICARBONATE AND POTASSIUM CHLORIDE 12.5 ML/KG/HR: 5.15; 2.03; 22; 5.4; 6.46; 3.09; .157 INJECTION INTRAVENOUS at 11:03

## 2023-01-01 RX ADMIN — NOREPINEPHRINE BITARTRATE 0.6 MCG/KG/MIN: 1 SOLUTION INTRAVENOUS at 08:39

## 2023-01-01 RX ADMIN — PANTOPRAZOLE SODIUM 8 MG/HR: 40 INJECTION, POWDER, FOR SOLUTION INTRAVENOUS at 02:51

## 2023-01-01 RX ADMIN — EPINEPHRINE 0.22 MCG/KG/MIN: 1 INJECTION INTRAMUSCULAR; INTRAVENOUS; SUBCUTANEOUS at 21:45

## 2023-01-01 RX ADMIN — SODIUM CHLORIDE 1000 ML: 9 INJECTION, SOLUTION INTRAVENOUS at 21:52

## 2023-01-01 RX ADMIN — VASOPRESSIN 2.4 UNITS/HR: 20 INJECTION, SOLUTION INTRAMUSCULAR; SUBCUTANEOUS at 13:10

## 2023-01-01 RX ADMIN — MIDAZOLAM HYDROCHLORIDE 2 MG: 1 INJECTION, SOLUTION INTRAMUSCULAR; INTRAVENOUS at 05:32

## 2023-01-01 RX ADMIN — PHENYLEPHRINE HYDROCHLORIDE 6 MCG/KG/MIN: 50 INJECTION INTRAVENOUS at 11:14

## 2023-01-01 RX ADMIN — METOPROLOL TARTRATE 5 MG: 1 INJECTION, SOLUTION INTRAVENOUS at 06:14

## 2023-01-01 RX ADMIN — CHLORHEXIDINE GLUCONATE 15 ML: 1.2 SOLUTION ORAL at 20:29

## 2023-01-01 RX ADMIN — CHLORHEXIDINE GLUCONATE 15 ML: 1.2 SOLUTION ORAL at 07:52

## 2023-01-01 RX ADMIN — LORAZEPAM 1 MG: 2 INJECTION INTRAMUSCULAR at 02:37

## 2023-01-01 RX ADMIN — CALCIUM CHLORIDE, MAGNESIUM CHLORIDE, DEXTROSE MONOHYDRATE, LACTIC ACID, SODIUM CHLORIDE, SODIUM BICARBONATE AND POTASSIUM CHLORIDE 200 ML/HR: 5.15; 2.03; 22; 5.4; 6.46; 3.09; .157 INJECTION INTRAVENOUS at 15:56

## 2023-01-01 RX ADMIN — ALBUMIN HUMAN 25 G: 0.05 INJECTION, SOLUTION INTRAVENOUS at 09:11

## 2023-01-01 RX ADMIN — PANTOPRAZOLE SODIUM 8 MG/HR: 40 INJECTION, POWDER, FOR SOLUTION INTRAVENOUS at 16:38

## 2023-01-01 RX ADMIN — Medication 0.5 MCG/KG/MIN: at 07:04

## 2023-01-01 RX ADMIN — SODIUM BICARBONATE 50 MEQ: 84 INJECTION, SOLUTION INTRAVENOUS at 06:15

## 2023-01-01 RX ADMIN — Medication 100 MEQ: at 22:05

## 2023-01-01 RX ADMIN — CHLORHEXIDINE GLUCONATE 15 ML: 1.2 SOLUTION ORAL at 21:56

## 2023-01-01 RX ADMIN — VASOPRESSIN 2.4 UNITS/HR: 20 INJECTION, SOLUTION INTRAMUSCULAR; SUBCUTANEOUS at 05:34

## 2023-01-01 RX ADMIN — HYDROCORTISONE SODIUM SUCCINATE 50 MG: 100 INJECTION, POWDER, FOR SOLUTION INTRAMUSCULAR; INTRAVENOUS at 11:27

## 2023-01-01 RX ADMIN — CALCIUM GLUCONATE 2 G: 20 INJECTION, SOLUTION INTRAVENOUS at 05:30

## 2023-01-01 RX ADMIN — FENTANYL CITRATE 100 MCG: 50 INJECTION, SOLUTION INTRAMUSCULAR; INTRAVENOUS at 03:18

## 2023-01-01 RX ADMIN — SODIUM BICARBONATE 50 MEQ: 84 INJECTION, SOLUTION INTRAVENOUS at 22:24

## 2023-01-01 RX ADMIN — VASOPRESSIN 2.4 UNITS/HR: 20 INJECTION, SOLUTION INTRAMUSCULAR; SUBCUTANEOUS at 13:55

## 2023-01-01 RX ADMIN — PHENYLEPHRINE HYDROCHLORIDE 6 MCG/KG/MIN: 50 INJECTION INTRAVENOUS at 10:19

## 2023-01-01 RX ADMIN — DEXTROSE: 20 INJECTION, SOLUTION INTRAVENOUS at 11:23

## 2023-01-01 RX ADMIN — Medication 50 MCG/HR: at 23:11

## 2023-01-01 RX ADMIN — HYDROCORTISONE SODIUM SUCCINATE 50 MG: 100 INJECTION, POWDER, FOR SOLUTION INTRAMUSCULAR; INTRAVENOUS at 23:24

## 2023-01-01 RX ADMIN — VASOPRESSIN 2.4 UNITS/HR: 20 INJECTION, SOLUTION INTRAMUSCULAR; SUBCUTANEOUS at 06:33

## 2023-01-01 RX ADMIN — OCTREOTIDE ACETATE 50 MCG: 50 INJECTION, SOLUTION INTRAVENOUS; SUBCUTANEOUS at 23:09

## 2023-01-01 RX ADMIN — MAGNESIUM SULFATE HEPTAHYDRATE 2 G: 40 INJECTION, SOLUTION INTRAVENOUS at 06:10

## 2023-01-01 RX ADMIN — PHENYLEPHRINE HYDROCHLORIDE 6 MCG/KG/MIN: 50 INJECTION INTRAVENOUS at 23:57

## 2023-01-01 RX ADMIN — ALBUMIN HUMAN 500 ML: 0.05 INJECTION, SOLUTION INTRAVENOUS at 01:50

## 2023-01-01 RX ADMIN — CALCIUM CHLORIDE, MAGNESIUM CHLORIDE, DEXTROSE MONOHYDRATE, LACTIC ACID, SODIUM CHLORIDE, SODIUM BICARBONATE AND POTASSIUM CHLORIDE 12.5 ML/KG/HR: 5.15; 2.03; 22; 5.4; 6.46; 3.09; .157 INJECTION INTRAVENOUS at 15:50

## 2023-01-01 RX ADMIN — SODIUM BICARBONATE 100 MEQ: 84 INJECTION, SOLUTION INTRAVENOUS at 22:05

## 2023-01-01 RX ADMIN — CALCIUM GLUCONATE 2 G: 20 INJECTION, SOLUTION INTRAVENOUS at 08:44

## 2023-01-01 RX ADMIN — CALCIUM GLUCONATE 2 G: 20 INJECTION, SOLUTION INTRAVENOUS at 02:04

## 2023-01-01 RX ADMIN — PIPERACILLIN AND TAZOBACTAM 3.38 G: 3; .375 INJECTION, POWDER, LYOPHILIZED, FOR SOLUTION INTRAVENOUS at 05:38

## 2023-01-01 RX ADMIN — SODIUM BICARBONATE 100 MEQ: 84 INJECTION, SOLUTION INTRAVENOUS at 06:16

## 2023-01-01 RX ADMIN — HYDROCORTISONE SODIUM SUCCINATE 50 MG: 100 INJECTION, POWDER, FOR SOLUTION INTRAMUSCULAR; INTRAVENOUS at 05:38

## 2023-01-01 RX ADMIN — Medication 50 MCG/HR: at 21:30

## 2023-01-01 RX ADMIN — PROPOFOL 75 MCG/KG/MIN: 10 INJECTION, EMULSION INTRAVENOUS at 11:43

## 2023-01-01 RX ADMIN — SODIUM BICARBONATE: 84 INJECTION, SOLUTION INTRAVENOUS at 09:20

## 2023-01-01 RX ADMIN — THIAMINE HYDROCHLORIDE 200 MG: 100 INJECTION, SOLUTION INTRAMUSCULAR; INTRAVENOUS at 07:49

## 2023-01-01 RX ADMIN — SODIUM CHLORIDE: 9 INJECTION, SOLUTION INTRAVENOUS at 05:18

## 2023-01-01 RX ADMIN — PROPOFOL 50 MCG/KG/MIN: 10 INJECTION, EMULSION INTRAVENOUS at 06:50

## 2023-01-01 RX ADMIN — HYDROCORTISONE SODIUM SUCCINATE 50 MG: 100 INJECTION, POWDER, FOR SOLUTION INTRAMUSCULAR; INTRAVENOUS at 17:33

## 2023-01-01 RX ADMIN — PANTOPRAZOLE SODIUM 8 MG/HR: 40 INJECTION, POWDER, FOR SOLUTION INTRAVENOUS at 18:48

## 2023-01-01 RX ADMIN — PHENYLEPHRINE HYDROCHLORIDE 6 MCG/KG/MIN: 50 INJECTION INTRAVENOUS at 17:24

## 2023-01-01 RX ADMIN — ALBUMIN (HUMAN) 500 ML: 12.5 INJECTION, SOLUTION INTRAVENOUS at 04:35

## 2023-01-01 RX ADMIN — DEXMEDETOMIDINE HYDROCHLORIDE 0.6 MCG/KG/HR: 400 INJECTION INTRAVENOUS at 04:17

## 2023-01-01 RX ADMIN — MIDAZOLAM HYDROCHLORIDE 4 MG: 1 INJECTION, SOLUTION INTRAMUSCULAR; INTRAVENOUS at 03:32

## 2023-01-01 RX ADMIN — CALCIUM CHLORIDE, MAGNESIUM CHLORIDE, SODIUM CHLORIDE, SODIUM BICARBONATE, POTASSIUM CHLORIDE AND SODIUM PHOSPHATE DIBASIC DIHYDRATE 12.5 ML/KG/HR: 3.68; 3.05; 6.34; 3.09; .314; .187 INJECTION INTRAVENOUS at 12:15

## 2023-01-01 RX ADMIN — ALBUMIN (HUMAN) 25 G: 12.5 SOLUTION INTRAVENOUS at 06:20

## 2023-01-01 RX ADMIN — SODIUM BICARBONATE 75 ML/HR: 84 INJECTION, SOLUTION INTRAVENOUS at 13:32

## 2023-01-01 RX ADMIN — IOHEXOL 90 ML: 350 INJECTION, SOLUTION INTRAVENOUS at 21:08

## 2023-01-01 RX ADMIN — MIDAZOLAM HYDROCHLORIDE 2 MG: 1 INJECTION, SOLUTION INTRAMUSCULAR; INTRAVENOUS at 02:56

## 2023-01-01 RX ADMIN — ALBUMIN HUMAN 25 G: 0.25 SOLUTION INTRAVENOUS at 06:20

## 2023-01-01 RX ADMIN — CALCIUM GLUCONATE 2 G: 20 INJECTION, SOLUTION INTRAVENOUS at 22:12

## 2023-01-01 RX ADMIN — SODIUM BICARBONATE 75 ML/HR: 84 INJECTION, SOLUTION INTRAVENOUS at 10:30

## 2023-01-01 RX ADMIN — Medication 0.6 MCG/KG/MIN: at 08:39

## 2023-01-01 RX ADMIN — Medication 150 MEQ: at 09:15

## 2023-01-01 RX ADMIN — MAGNESIUM SULFATE HEPTAHYDRATE 2 G: 40 INJECTION, SOLUTION INTRAVENOUS at 05:49

## 2023-01-01 RX ADMIN — DEXTROSE MONOHYDRATE: 100 INJECTION, SOLUTION INTRAVENOUS at 05:21

## 2023-01-01 RX ADMIN — LORAZEPAM 1 MG: 2 INJECTION INTRAMUSCULAR at 20:26

## 2023-01-01 RX ADMIN — CALCIUM CHLORIDE, MAGNESIUM CHLORIDE, DEXTROSE MONOHYDRATE, LACTIC ACID, SODIUM CHLORIDE, SODIUM BICARBONATE AND POTASSIUM CHLORIDE 12.5 ML/KG/HR: 5.15; 2.03; 22; 5.4; 6.46; 3.09; .157 INJECTION INTRAVENOUS at 19:49

## 2023-01-01 RX ADMIN — DEXTROSE MONOHYDRATE 50 ML: 25 INJECTION, SOLUTION INTRAVENOUS at 04:41

## 2023-01-01 RX ADMIN — CALCIUM CHLORIDE, MAGNESIUM CHLORIDE, SODIUM CHLORIDE, SODIUM BICARBONATE, POTASSIUM CHLORIDE AND SODIUM PHOSPHATE DIBASIC DIHYDRATE 200 ML/HR: 3.68; 3.05; 6.34; 3.09; .314; .187 INJECTION INTRAVENOUS at 00:32

## 2023-01-01 RX ADMIN — PHYTONADIONE 10 MG: 10 INJECTION, EMULSION INTRAMUSCULAR; INTRAVENOUS; SUBCUTANEOUS at 04:53

## 2023-01-01 RX ADMIN — SODIUM BICARBONATE 75 ML/HR: 84 INJECTION, SOLUTION INTRAVENOUS at 19:35

## 2023-01-01 RX ADMIN — ANGIOTENSIN II 20 NG/KG/MIN: 2.5 INJECTION INTRAVENOUS at 02:37

## 2023-01-01 RX ADMIN — PROPOFOL 75 MCG/KG/MIN: 10 INJECTION, EMULSION INTRAVENOUS at 09:31

## 2023-01-01 RX ADMIN — EPINEPHRINE 0.02 MCG/KG/MIN: 1 INJECTION INTRAMUSCULAR; INTRAVENOUS; SUBCUTANEOUS at 17:31

## 2023-01-01 RX ADMIN — SODIUM CHLORIDE, POTASSIUM CHLORIDE, SODIUM LACTATE AND CALCIUM CHLORIDE 1000 ML: 600; 310; 30; 20 INJECTION, SOLUTION INTRAVENOUS at 22:08

## 2023-01-01 RX ADMIN — CEFTRIAXONE SODIUM 2 G: 2 INJECTION, POWDER, FOR SOLUTION INTRAMUSCULAR; INTRAVENOUS at 01:09

## 2023-01-01 RX ADMIN — DEXMEDETOMIDINE HYDROCHLORIDE 0.6 MCG/KG/HR: 400 INJECTION INTRAVENOUS at 10:18

## 2023-01-01 RX ADMIN — PROPOFOL 5 MCG/KG/MIN: 10 INJECTION, EMULSION INTRAVENOUS at 02:56

## 2023-01-01 RX ADMIN — Medication 0.03 MCG/KG/MIN: at 04:50

## 2023-01-01 RX ADMIN — PROPOFOL 75 MCG/KG/MIN: 10 INJECTION, EMULSION INTRAVENOUS at 13:41

## 2023-01-01 RX ADMIN — DEXTROSE: 20 INJECTION, SOLUTION INTRAVENOUS at 19:46

## 2023-01-01 RX ADMIN — Medication 6 MCG/KG/MIN: at 18:29

## 2023-01-01 RX ADMIN — MINERAL OIL AND PETROLATUM: 150; 830 OINTMENT OPHTHALMIC at 21:09

## 2023-01-01 RX ADMIN — CALCIUM CHLORIDE, MAGNESIUM CHLORIDE, DEXTROSE MONOHYDRATE, LACTIC ACID, SODIUM CHLORIDE, SODIUM BICARBONATE AND POTASSIUM CHLORIDE 12.5 ML/KG/HR: 5.15; 2.03; 22; 5.4; 6.46; 3.09; .157 INJECTION INTRAVENOUS at 15:53

## 2023-01-01 RX ADMIN — NOREPINEPHRINE BITARTRATE 0.6 MCG/KG/MIN: 1 SOLUTION INTRAVENOUS at 23:18

## 2023-01-01 RX ADMIN — PHENYLEPHRINE HYDROCHLORIDE 6 MCG/KG/MIN: 50 INJECTION INTRAVENOUS at 04:17

## 2023-01-01 RX ADMIN — PANTOPRAZOLE SODIUM 8 MG/HR: 40 INJECTION, POWDER, FOR SOLUTION INTRAVENOUS at 06:38

## 2023-01-01 RX ADMIN — ALBUMIN HUMAN 25 G: 0.25 SOLUTION INTRAVENOUS at 01:43

## 2023-01-01 RX ADMIN — CALCIUM GLUCONATE 4 G: 98 INJECTION, SOLUTION INTRAVENOUS at 13:25

## 2023-01-01 RX ADMIN — SODIUM BICARBONATE: 84 INJECTION, SOLUTION INTRAVENOUS at 22:43

## 2023-01-01 RX ADMIN — VANCOMYCIN HYDROCHLORIDE 2500 MG: 1 INJECTION, POWDER, LYOPHILIZED, FOR SOLUTION INTRAVENOUS at 00:32

## 2023-01-01 RX ADMIN — HYDROCORTISONE SODIUM SUCCINATE 50 MG: 100 INJECTION, POWDER, FOR SOLUTION INTRAMUSCULAR; INTRAVENOUS at 23:15

## 2023-01-01 RX ADMIN — ALBUMIN (HUMAN) 250 ML: 12.5 INJECTION, SOLUTION INTRAVENOUS at 06:29

## 2023-01-01 RX ADMIN — DEXTROSE: 20 INJECTION, SOLUTION INTRAVENOUS at 12:10

## 2023-01-01 RX ADMIN — VASOPRESSIN 2.4 UNITS/HR: 20 INJECTION, SOLUTION INTRAMUSCULAR; SUBCUTANEOUS at 22:09

## 2023-01-01 RX ADMIN — NOREPINEPHRINE BITARTRATE 0.6 MCG/KG/MIN: 1 SOLUTION INTRAVENOUS at 09:40

## 2023-01-01 RX ADMIN — SODIUM BICARBONATE 50 MEQ: 84 INJECTION, SOLUTION INTRAVENOUS at 18:51

## 2023-01-01 RX ADMIN — PIPERACILLIN AND TAZOBACTAM 3.38 G: 3; .375 INJECTION, POWDER, LYOPHILIZED, FOR SOLUTION INTRAVENOUS at 20:57

## 2023-01-01 RX ADMIN — THIAMINE HYDROCHLORIDE 200 MG: 100 INJECTION, SOLUTION INTRAMUSCULAR; INTRAVENOUS at 10:23

## 2023-01-01 RX ADMIN — Medication 6 MCG/KG/MIN: at 22:59

## 2023-01-01 RX ADMIN — ALBUMIN (HUMAN) 25 G: 12.5 SOLUTION INTRAVENOUS at 01:43

## 2023-01-01 RX ADMIN — NOREPINEPHRINE BITARTRATE 0.4 MCG/KG/MIN: 1 SOLUTION INTRAVENOUS at 02:21

## 2023-01-01 RX ADMIN — EPOPROSTENOL 20 NG/KG/MIN: 1.5 INJECTION, POWDER, LYOPHILIZED, FOR SOLUTION INTRAVENOUS at 04:57

## 2023-01-01 RX ADMIN — SODIUM BICARBONATE 75 ML/HR: 84 INJECTION, SOLUTION INTRAVENOUS at 03:58

## 2023-01-01 RX ADMIN — PIPERACILLIN AND TAZOBACTAM 3.38 G: 3; .375 INJECTION, POWDER, LYOPHILIZED, FOR SOLUTION INTRAVENOUS at 23:29

## 2023-01-01 RX ADMIN — SUCCINYLCHOLINE CHLORIDE 142 MG: 20 INJECTION, SOLUTION INTRAMUSCULAR; INTRAVENOUS; PARENTERAL at 02:54

## 2023-01-01 RX ADMIN — CALCIUM CHLORIDE, MAGNESIUM CHLORIDE, SODIUM CHLORIDE, SODIUM BICARBONATE, POTASSIUM CHLORIDE AND SODIUM PHOSPHATE DIBASIC DIHYDRATE 12.5 ML/KG/HR: 3.68; 3.05; 6.34; 3.09; .314; .187 INJECTION INTRAVENOUS at 00:31

## 2023-01-01 RX ADMIN — CALCIUM GLUCONATE 4 G: 98 INJECTION, SOLUTION INTRAVENOUS at 23:07

## 2023-01-01 RX ADMIN — SODIUM BICARBONATE 50 MEQ: 84 INJECTION, SOLUTION INTRAVENOUS at 19:05

## 2023-01-01 RX ADMIN — DEXMEDETOMIDINE HYDROCHLORIDE 0.2 MCG/KG/HR: 400 INJECTION INTRAVENOUS at 05:26

## 2023-01-01 RX ADMIN — LIDOCAINE HYDROCHLORIDE 2 ML: 10 INJECTION, SOLUTION EPIDURAL; INFILTRATION; INTRACAUDAL; PERINEURAL at 00:11

## 2023-01-01 RX ADMIN — CALCIUM CHLORIDE, MAGNESIUM CHLORIDE, SODIUM CHLORIDE, SODIUM BICARBONATE, POTASSIUM CHLORIDE AND SODIUM PHOSPHATE DIBASIC DIHYDRATE 12.5 ML/KG/HR: 3.68; 3.05; 6.34; 3.09; .314; .187 INJECTION INTRAVENOUS at 04:20

## 2023-01-01 RX ADMIN — NOREPINEPHRINE BITARTRATE 0.6 MCG/KG/MIN: 1 SOLUTION INTRAVENOUS at 14:23

## 2023-01-01 RX ADMIN — SODIUM BICARBONATE: 84 INJECTION, SOLUTION INTRAVENOUS at 15:48

## 2023-01-01 RX ADMIN — VANCOMYCIN HYDROCHLORIDE 1000 MG: 1 INJECTION, SOLUTION INTRAVENOUS at 11:44

## 2023-01-01 RX ADMIN — PIPERACILLIN AND TAZOBACTAM 3.38 G: 3; .375 INJECTION, POWDER, LYOPHILIZED, FOR SOLUTION INTRAVENOUS at 04:34

## 2023-01-01 RX ADMIN — SODIUM BICARBONATE 150 MEQ: 84 INJECTION, SOLUTION INTRAVENOUS at 09:15

## 2023-01-01 RX ADMIN — MINERAL OIL AND PETROLATUM: 150; 830 OINTMENT OPHTHALMIC at 08:45

## 2023-01-01 RX ADMIN — Medication 4.5 MCG/KG/MIN: at 13:53

## 2023-01-01 RX ADMIN — CALCIUM CHLORIDE, MAGNESIUM CHLORIDE, DEXTROSE MONOHYDRATE, LACTIC ACID, SODIUM CHLORIDE, SODIUM BICARBONATE AND POTASSIUM CHLORIDE 12.5 ML/KG/HR: 5.15; 2.03; 22; 5.4; 6.46; 3.09; .157 INJECTION INTRAVENOUS at 11:01

## 2023-01-01 RX ADMIN — IPRATROPIUM BROMIDE AND ALBUTEROL SULFATE 3 ML: .5; 3 SOLUTION RESPIRATORY (INHALATION) at 08:04

## 2023-01-01 RX ADMIN — DEXTROSE MONOHYDRATE 50 ML: 25 INJECTION, SOLUTION INTRAVENOUS at 19:37

## 2023-01-01 RX ADMIN — VASOPRESSIN 2.4 UNITS/HR: 20 INJECTION, SOLUTION INTRAMUSCULAR; SUBCUTANEOUS at 06:20

## 2023-01-01 RX ADMIN — THIAMINE HYDROCHLORIDE 200 MG: 100 INJECTION, SOLUTION INTRAMUSCULAR; INTRAVENOUS at 11:16

## 2023-01-01 RX ADMIN — PANTOPRAZOLE SODIUM 8 MG/HR: 40 INJECTION, POWDER, FOR SOLUTION INTRAVENOUS at 01:01

## 2023-01-01 RX ADMIN — SODIUM CHLORIDE: 9 INJECTION, SOLUTION INTRAVENOUS at 19:53

## 2023-01-01 RX ADMIN — NOREPINEPHRINE BITARTRATE 0.6 MCG/KG/MIN: 1 SOLUTION INTRAVENOUS at 03:57

## 2023-01-01 RX ADMIN — Medication 6 MCG/KG/MIN: at 15:29

## 2023-01-01 RX ADMIN — SODIUM BICARBONATE 50 MEQ: 84 INJECTION, SOLUTION INTRAVENOUS at 19:14

## 2023-01-01 RX ADMIN — ONDANSETRON 8 MG: 2 INJECTION INTRAMUSCULAR; INTRAVENOUS at 22:16

## 2023-01-01 RX ADMIN — CALCIUM GLUCONATE 2 G: 20 INJECTION, SOLUTION INTRAVENOUS at 05:31

## 2023-01-01 RX ADMIN — ALBUMIN (HUMAN) 250 ML: 12.5 INJECTION, SOLUTION INTRAVENOUS at 05:24

## 2023-01-01 RX ADMIN — SODIUM BICARBONATE 75 ML/HR: 84 INJECTION, SOLUTION INTRAVENOUS at 00:46

## 2023-01-01 RX ADMIN — DEXTROSE: 20 INJECTION, SOLUTION INTRAVENOUS at 00:48

## 2023-01-01 RX ADMIN — DEXTROSE MONOHYDRATE 50 ML: 25 INJECTION, SOLUTION INTRAVENOUS at 11:04

## 2023-01-01 ASSESSMENT — ACTIVITIES OF DAILY LIVING (ADL)
ADLS_ACUITY_SCORE: 32
ADLS_ACUITY_SCORE: 35
TRANSFERRING: 1-->ASSISTANCE (EQUIPMENT/PERSON) NEEDED (NOT DEVELOPMENTALLY APPROPRIATE)
ADLS_ACUITY_SCORE: 32
ADLS_ACUITY_SCORE: 32
DIFFICULTY_EATING/SWALLOWING: NO
ADLS_ACUITY_SCORE: 32
TRANSFERRING: 1-->ASSISTANCE (EQUIPMENT/PERSON) NEEDED
ADLS_ACUITY_SCORE: 32
DEPENDENT_IADLS:: TRANSPORTATION
ADLS_ACUITY_SCORE: 32
WEAR_GLASSES_OR_BLIND: NO
ADLS_ACUITY_SCORE: 32
ADLS_ACUITY_SCORE: 35
ADLS_ACUITY_SCORE: 32
ADLS_ACUITY_SCORE: 32
WALKING_OR_CLIMBING_STAIRS_DIFFICULTY: YES
TOILETING_ISSUES: NO
ADLS_ACUITY_SCORE: 32
ADLS_ACUITY_SCORE: 32
WALKING_OR_CLIMBING_STAIRS: OTHER (SEE COMMENTS)
DOING_ERRANDS_INDEPENDENTLY_DIFFICULTY: NO
DRESSING/BATHING_DIFFICULTY: NO
ADLS_ACUITY_SCORE: 32
ADLS_ACUITY_SCORE: 28
ADLS_ACUITY_SCORE: 32
ADLS_ACUITY_SCORE: 35
FALL_HISTORY_WITHIN_LAST_SIX_MONTHS: NO
ADLS_ACUITY_SCORE: 32
ADLS_ACUITY_SCORE: 32
ADLS_ACUITY_SCORE: 35
ADLS_ACUITY_SCORE: 32
CONCENTRATING,_REMEMBERING_OR_MAKING_DECISIONS_DIFFICULTY: NO
ADLS_ACUITY_SCORE: 32
CHANGE_IN_FUNCTIONAL_STATUS_SINCE_ONSET_OF_CURRENT_ILLNESS/INJURY: YES

## 2023-01-01 ASSESSMENT — ENCOUNTER SYMPTOMS
VOMITING: 1
BACK PAIN: 1
MYALGIAS: 1
ABDOMINAL PAIN: 1

## 2023-02-08 PROBLEM — F10.929 ALCOHOLIC INTOXICATION WITH COMPLICATION (H): Status: ACTIVE | Noted: 2023-01-01

## 2023-02-08 PROBLEM — K92.0 HEMATEMESIS WITH NAUSEA: Status: ACTIVE | Noted: 2023-01-01

## 2023-02-08 PROBLEM — R57.8 HEMORRHAGIC SHOCK (H): Status: ACTIVE | Noted: 2023-01-01

## 2023-02-08 PROBLEM — K70.9 ALCOHOLIC LIVER DAMAGE (H): Status: ACTIVE | Noted: 2023-01-01

## 2023-02-08 NOTE — PROGRESS NOTES
[facility] Pulmonary/Critical Care Consult Team Note    Osmani Salazar,  1975, MRN [id number]  Admitting Dx: Hemorrhagic shock (H) [R57.8]  Alcoholic liver damage (H) [K70.9]  Alcoholic intoxication with complication (H) [F10.929]  Hematemesis with nausea [K92.0]  Date / Time of Admission:  2023  9:51 PM    Overnight Events:  Intake/Output last 3 shifts:  I/O last 3 completed shifts:  In: 6400 [I.V.:4000; IV Piggyback:1000]  Out: 3075 [Urine:175; Emesis/NG output:2200; Blood:700]  On protonix, octreotide, and vasopressors  Had EGD with varices oozing and banded  Getting blood transfusion currently    Assessment/Plan: Osmani Salazar is a 47 year old male with PMHx of alcohol abuse , alcohol liver disease, asthma who presents with hematemesis and acute blood loss anemia s/p intubation .    GI: liver with  cirrhotic changes, portal hypertension, esophageal varices with acute GI Bleed  - Hgb q6hrs and after blood products  - goal Hgb >7  - GI is following  - s/p EGD with banding of oozing varices night of -  - Continue PPI and octreotide drip  - Ceftriaxone  - may need paracentesis with ascites    PULM/ID: intubated  for airway protection and hematemesis  - LAST VENT SETTINGS:   Vent Mode: CMV/AC  (Continuous Mandatory Ventilation/ Assist Control)  FiO2 (%): 40 %  Resp Rate (Set): (S) 24 breaths/min  Tidal Volume (Set, mL): (S) 500 mL  PEEP (cm H2O): 5 cmH2O  Resp: 26  - Last Arterial Blood Gas:  pH Arterial   Date Value Ref Range Status   2023 7.10 (LL) 7.37 - 7.44 Final     pH   Date Value Ref Range Status   2023 7.07 (LL) 7.35 - 7.45 Final     pCO2 Arterial   Date Value Ref Range Status   2023 30 (L) 35 - 45 mm Hg Final     pO2 Arterial   Date Value Ref Range Status   2023 133 (H) 80 - 90 mm Hg Final     Bicarbonate Arterial   Date Value Ref Range Status   2023 9 (L) 23 - 29 mmol/L Final     O2 Sat, Arterial   Date Value Ref Range Status   2023 98.7 (H) 96.0 -  97.0 % Final     Base Excess/Deficit (+/-)   Date Value Ref Range Status   02/08/2023 -20.4   mmol/L Final     CV: Hypovolemic Shock from acute blood loss  - on vasopressors, levo, lina, and vasopressin   - MAP >65  - Monitor on tele    RENAL: Acute Kidney Injury due to above  - current Cr 1.18  - baseline Cr of 0.9  - Follow BUN/Creatinine  - strict I/O's    NEURO: sedated  - RASS goal -2  - on propofol and fentanyl    ENDO:  - Critical Care hyperglycemic protocol  - Accuchecks and sliding scale q4    Pt has critical illness and impairs breathing on vent and circulation on vasopressors such as there is high probability of imminent of life threatening deterioration in the patient's condition.    Code Status: FULL CODE    Infusions:    fentaNYL 100 mcg/hr (02/08/23 0822)     norepinephrine 0.6 mcg/kg/min (02/08/23 0800)     octreotide (sandoSTATIN) infusion ADULT 50 mcg/hr (02/08/23 0800)     pantoprazole (PROTONIX) infusion ADULT/PEDS GREATER than or EQUAL to 45 kg 8 mg/hr (02/08/23 0430)     phenylephrine 0.5 mcg/kg/min (02/08/23 0704)     propofol 75 mcg/kg/min (02/08/23 0824)     sodium chloride 75 mL/hr at 02/08/23 0518     vasopressin 2.4 Units/hr (02/08/23 0633)       ICU DAILY CHECKLIST           Can patient transfer out of MICU? no  FAST HUG:  Feeding:  Feeding: No  Hernadez:{Yes  Analgesia/Sedation:Yes  Thromboembolic prophylaxis:SCDs  HOB>30:  No  Stress Ulcer Protocol Active: Yes; Mode: PPI/H2 Antagonist  Glycemic Control: No components found for: GLU Any glucose > 180 no; Mode of Insulin Therapy: Sliding Scale Insulin  INTUBATED:  Can patient have daily waking: no  Can patient have spontaneous breathing trial: no;  Does pt have restraints: MD RESTRAINT FOR NON-VIOLENT BEHAVIOR FACE TO FACE EVALUATION Patient's Immediate Situation: Patient demonstrated the following behaviors: Impulsive behavior, grabbing at support tubes/lines.  Patient's Reaction to the intervention Does patient understand the reason for  restraint/seclusion? Unable to Express Medical Condition Is there any evidence of compromise of Skin integrity, Respiratory, Cardiovascular, Musculoskeletal, Hydration? No Behavioral ConditionIn consultation with the RN, is there a need to continue this restraint or seclusion? Yes See Restraint Flowsheet for complete restraint documentation and assessment.  PHYSICAL THERAPY AND MOBILITY: Can patient have PT and mobility trial: no Activity: ICU mobility protocol    Critical Care Time excluding procedures and family discussions greater than: 1 Hour    Risk Factors Present on Admission:  Clinically Significant Risk Factors Present on Admission             # Anion Gap Metabolic Acidosis: Highest Anion Gap = 25 mmol/L in last 2 days, will monitor and treat as appropriate  # Hypoalbuminemia: Lowest albumin = 2 g/dL at 2/8/2023  4:05 AM, will monitor as appropriate  # Coagulation Defect: INR = 2.31 (Ref range: 0.85 - 1.15) and/or PTT = N/A, will monitor for bleeding  # Thrombocytopenia: Lowest platelets = 51 in last 2 days, will monitor for bleeding     # Circulatory Shock: currently requiring pressors for blood pressure support  # Acute Respiratory Failure: Documented O2 saturation < 91%.  Continue supplemental oxygen as needed     # Obesity: Estimated body mass index is 30.6 kg/m  as calculated from the following:    Height as of this encounter: 1.829 m (6').    Weight as of this encounter: 102.3 kg (225 lb 9.6 oz).                   Juliet Villanueva DO  Pulmonary and Critical Care Attending  pgr [phone]    Allergies   Allergen Reactions     Seasonal Allergies        Meds: See MAR    Physical Exam:  /56   Pulse (!) 121   Temp 97.8  F (36.6  C) (Oral)   Resp 26   Ht 1.829 m (6')   Wt 102.3 kg (225 lb 9.6 oz)   SpO2 100%   BMI 30.60 kg/m    Intake/Output this shift:  I/O this shift:  In: 628.26 [I.V.:628.26]  Out: 40 [Urine:40]  GEN: Intubated and sedated, NAD  HEENT: et tube in place  CVS: regular rhythm,  no murmurs  RESP: CTA BL  ABD: Soft, No abdominal pain with palpation, no guarding, no rigidity, distended  EXT: Warm, well perfused, no LE edema  NEURO:  sedated  PSYCH: sedated    Pertinent Labs: Latest lab results in EHR personally reviewed.   CMP  Recent Labs   Lab 02/08/23  0759 02/08/23  0739 02/08/23  0500 02/08/23  0405 02/07/23 2150   NA  --   --   --  142 140   POTASSIUM  --   --   --  4.8 3.7   CHLORIDE  --   --   --  104 104   CO2  --   --   --  13* 24   ANIONGAP  --   --   --  25* 12   * 67* 81 96 120*   BUN  --   --   --  8.9 7.5   CR  --   --   --  1.18* 0.90   GFRESTIMATED  --   --   --  77 >90   ADELAIDE  --   --   --  7.1* 7.8*   PROTTOTAL  --   --   --  4.6* 6.3*   ALBUMIN  --   --   --  2.0* 2.4*   BILITOTAL  --   --   --  6.2* 8.1*   ALKPHOS  --   --   --  85 124   AST  --   --   --  168* 180*   ALT  --   --   --  52* 60*     CBC  Recent Labs   Lab 02/08/23  0559 02/08/23 0405 02/07/23 2150   WBC  --  11.9* 10.6   RBC  --  2.02* 2.52*   HGB 6.3* 6.9* 8.7*   HCT  --  21.5* 26.5*   MCV  --  106* 105*   MCH  --  34.2* 34.5*   MCHC  --  32.1 32.8   RDW  --  18.2* 14.9   PLT  --  78* 51*     INR  Recent Labs   Lab 02/07/23 2150   INR 2.31*     Arterial Blood Gas  Recent Labs   Lab 02/08/23  0712 02/08/23  0559 02/08/23  0435   PH 7.10* 7.07* 7.13*   PCO2 30*  --  36   PO2 133*  --  138*   HCO3 9*  --  12*   O2PER 0.4  --  50       Cultures: not yet available.    Imaging: personally reviewed.   Results for orders placed or performed during the hospital encounter of 02/07/23   CT Abdomen Pelvis w Contrast    Narrative    EXAM: CT ABDOMEN PELVIS W CONTRAST  LOCATION: [facility]  DATE/TIME: 2/8/2023 2:18 AM    INDICATION: Hematemesis, recent liver failure, trauma; evaluate for possible traumatic abdominal injury.  COMPARISON: None.  TECHNIQUE: CT scan of the abdomen and pelvis was performed following injection of IV contrast. Multiplanar reformats were obtained. Dose  reduction techniques were used.  CONTRAST: 100 mL Isovue 370    FINDINGS:   LOWER CHEST: Elevation right hemidiaphragm with some mild chronic-appearing volume loss at the right lung base. Small hiatal hernia. Paraesophageal varices.    HEPATOBILIARY: Diffuse heterogeneity of the liver with nodular contour compatible with underlying hepatic cirrhosis. There are a couple nonspecific subcentimeter low-attenuation lesions in the liver. Small amount of stone or sludge material in the   gallbladder. Gallbladder wall appears thickened, which is probably secondary to underlying liver disease. No biliary dilatation.    PANCREAS: Normal.    SPLEEN: Mild splenic enlargement of the spleen measuring 16 cm in length.    ADRENAL GLANDS: Normal.    KIDNEYS/BLADDER: Normal.    BOWEL: Bowel is normal in caliber and negative for obstruction. Diffuse edema seen throughout the duodenum, loops of mid and proximal small bowel, and in the majority of the colon. Findings favored to be secondary to passive venous congestion in the   setting of portal venous hypertension. Nonspecific enteritis/colitis felt to be less likely. Clinical correlation.    LYMPH NODES: Normal.    VASCULATURE: Normal caliber abdominal aorta. Numerous varices are present including recanalized periumbilical vein compatible with portal venous hypertension. Moderate to large amount of ascites.    PELVIC ORGANS: Normal.    MUSCULOSKELETAL: Mild linear sclerosis in the superior aspect of both femoral heads suggestive of AVN. No evidence for articular collapse.      Impression    IMPRESSION:   1.  Diffuse heterogeneity of the liver with nodular contour compatible with underlying cirrhosis.    2.  Evidence for portal venous hypertension with a large amount of ascites, splenic enlargement, and numerous varices.    3.  Generalized edema in the duodenum, numerous loops of small bowel, and the majority of the colon. Findings favored to be more likely due to passive venous  congestion in the setting of portal venous hypertension versus, less likely, a nonspecific   enteritis/colitis. Clinical correlation.    4.  Small hiatal hernia. Paraesophageal varices are present.    5.  Small amount of stone or sludge material in the gallbladder. There is some generalized gallbladder wall thickening which is nonspecific and favored to be more likely secondary to underlying hepatic parenchymal disease. If there is concern over the   gallbladder, ultrasound may be helpful in further evaluation.    6.  Changes of AVN in the femoral heads bilaterally. No evidence for articular collapse.   Lumbar spine CT w/o contrast    Narrative    EXAM: CT LUMBAR SPINE W/O CONTRAST  LOCATION: [facility]  DATE/TIME: 2/8/2023 2:17 AM    INDICATION: subacute accident, persistent pain lumbar spine, eval for fx  COMPARISON: MRI 12/28/2022  TECHNIQUE: Routine CT Lumbar Spine without IV contrast. Multiplanar reformats. Dose reduction techniques were used.     FINDINGS:  VERTEBRA: Normal vertebral body heights and alignment. No fracture or posttraumatic subluxation.     CANAL/FORAMINA: No canal or neural foraminal stenosis.    PARASPINAL: Ascites.      Impression    IMPRESSION:  1.  No fracture or posttraumatic subluxation.  2.  No high-grade spinal canal or neural foraminal stenosis.   Head CT w/o contrast    Narrative    EXAM: CT HEAD W/O CONTRAST  LOCATION: [facility]  DATE/TIME: 2/8/2023 2:16 AM    INDICATION: remote accident, vomiting, r o Subdural  COMPARISON: 11/26/2018  TECHNIQUE: Routine CT Head without IV contrast. Multiplanar reformats. Dose reduction techniques were used.    FINDINGS:  INTRACRANIAL CONTENTS: No intracranial hemorrhage, extraaxial collection, or mass effect.  No CT evidence of acute infarct. Chronic lacunar infarct versus dilated perivascular space in the deep right parietal white matter. Normal ventricles and sulci.     VISUALIZED  ORBITS/SINUSES/MASTOIDS: No intraorbital abnormality. Retention cysts right maxillary sinus. No middle ear or mastoid effusion.    BONES/SOFT TISSUES: No acute abnormality.      Impression    IMPRESSION:  1.  No acute intracranial process.     XR Chest Port 1 View    Narrative    EXAM: XR CHEST PORT 1 VIEW  LOCATION: [facility]  DATE/TIME: 2/8/2023 3:02 AM    INDICATION: s p intubation  COMPARISON: 11/20/2018      Impression    IMPRESSION: Endotracheal tube terminates 2.6 cm above the padma. Distal portion of the enteric tube is excluded from the field-of-view but is below the diaphragm. Low lung volumes. Right PICC terminates in the right atrium. Clear lungs. Similar   appearance of the cardiomediastinal silhouette.       Patient Active Problem List   Diagnosis     Hemorrhagic shock (H)     Alcoholic liver damage (H)     Alcoholic intoxication with complication (H)     Hematemesis with nausea       Juliet Villanueva,   Pulmonary and Critical Care Attending  pgr [phone]    Securely message with the Vocera Web Console (learn more here)

## 2023-02-08 NOTE — CONSULTS
Care Management Initial Consult    General Information  Assessment completed with: Spouse or significant other,    Type of CM/SW Visit: Initial Assessment    Primary Care Provider verified and updated as needed: Yes   Readmission within the last 30 days: no previous admission in last 30 days         Advance Care Planning: Advance Care Planning Reviewed:  (No HCD)          Communication Assessment  Patient's communication style: spoken language (English or Bilingual)    Hearing Difficulty or Deaf: no   Wear Glasses or Blind: no    Cognitive  Cognitive/Neuro/Behavioral: .WDL except, all  Level of Consciousness: sedated  Arousal Level: arouses to repeated stimulation  Orientation: other (see comments) (TAY)  Mood/Behavior: calm     Speech: endotracheal tube    Living Environment:   People in home: spouse     Current living Arrangements: house      Able to return to prior arrangements: yes       Family/Social Support:  Care provided by: self  Provides care for: no one  Marital Status:   Wife          Description of Support System: Supportive         Current Resources:   Patient receiving home care services: No     Community Resources: None  Equipment currently used at home: none  Supplies currently used at home: None    Employment/Financial:  Employment Status: unemployed        Financial Concerns:             Lifestyle & Psychosocial Needs:  Social Determinants of Health     Tobacco Use: Not on file   Alcohol Use: Not on file   Financial Resource Strain: Not on file   Food Insecurity: Not on file   Transportation Needs: Not on file   Physical Activity: Not on file   Stress: Not on file   Social Connections: Not on file   Intimate Partner Violence: Not on file   Depression: Not on file   Housing Stability: Not on file       Functional Status:  Prior to admission patient needed assistance:   Dependent ADLs:: Independent  Dependent IADLs:: Transportation       Mental Health Status:  Mental Health Status:   (Inpatient mental health care October 2022)       Chemical Dependency Status:  Chemical Dependency Status: Current Concern               Additional Information:    Patient intubated and sedated. Assessment completed with patient's spouse at bedside. Patient and spouse live in their house. They have two adult daughter (22 and 24). Patient is independent with ADLs and IADLs. He does not drive, ambulates without devices and is unemployed. Patient had recent inpatient mental health stay in October. Spouse reports patient did not follow up with CD and mental health support recommendations.     Final discharge plan pending. Anticipating need for [facility] follow up.     Shira Catalan RN

## 2023-02-08 NOTE — PROCEDURES
Gastroenterology Endoscopy Brief Operative Note    Procedure:  Upper endoscopy   Post-operative diagnosis:  Upper GI bleed, esophageal varices   Staff Physician:  Bertram   Fellow/Assistant(s):  NA    Specimens:  Please see final procedure note for further details.   Findings:  3 columns of esophageal varices, one with oozing and fibrin plug with red perlita sign which was banded. Two additional bands placed. Blood and clot in stomach was suctioned resulting in partial visualization of the fundus- the stomach could not be entirely cleared. Antrum and duodenum appeared normal.    Complications:  None.   Condition:  Stable   Recommendations  Diet:  NPO  PPI:  PPI IV BID  Anti-coagulants/platelets:  Hold anticoagulation  Octreotide:  Continue octreotide drip  Other:   Continue ceftriaxone.  No OG tube.   Diagnostic paracentesis.   Trend hemoglobin Q6, goal hemoglobin 7-9.   Vit K 10mg IV (ordered).   Expect some melena/hematochezia. If hemodynamically unstable, recommend venous phase CT to better characterize varices, potentially repeat EGD.
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
## 2023-02-08 NOTE — PROGRESS NOTES
Remains on mechanical ventilation, BS clear and equal bilat, ABG drawn and reported to MD     Latest Reference Range & Units 02/08/23 07:12   pH Arterial 7.37 - 7.44  7.10 (LL)   pCO2 Arterial 35 - 45 mm Hg 30 (L)   PO2 Arterial 80 - 90 mm Hg 133 (H)   Bicarbonate Arterial 23 - 29 mmol/L 9 (L)   Base Excess Art mmol/L -20.4   FIO2  0.4   Oxyhemoglobin 96.0 - 97.0 % 96.5   Tyron's Test  Yes   (LL): Data is critically low  (L): Data is abnormally low  (H): Data is abnormally high    Vent Mode: Other (see comments) (VC AC)  FiO2 (%): 30 %  Resp Rate (Set): 24 breaths/min  Tidal Volume (Set, mL): 500 mL  PEEP (cm H2O): 5 cmH2O  Resp: 16    BP (!) 89/44   Pulse (!) 126   Temp 97.9  F (36.6  C) (Oral)   Resp 16   Ht 1.829 m (6')   Wt 102.3 kg (225 lb 9.6 oz)   SpO2 100%   BMI 30.60 kg/m      RT to monitor

## 2023-02-08 NOTE — PROGRESS NOTES
Patient intubated with 7.5 ETT secured 24@lip. Current vent settings are AC 16/500/+5/50%. RT will continue to monitor.

## 2023-02-08 NOTE — PROGRESS NOTES
PROVIDER RESTRAINT FOR NON-VIOLENT BEHAVIOR FACE TO FACE EVALUATION    Patient's Immediate Situation:  Patient demonstrated the following behaviors: pulling lines    Patient's Reaction to the intervention:  Does patient understand the reason for restraint/seclusion? unable to express    Medical Condition:  Is there any evidence of compromise of Skin integrity, Respiratory, Cardiovascular, Musculoskeletal, Hydration?   Yes    Behavioral Condition:  In consultation with the RN, is there a need to continue this restraint or seclusion? Yes    See Restraint Flowsheet for complete restraint documentation and assessment.      Roberto Mccoy MD  Pulmonary Critical Care Medicine  02/08/23 3:20 AM

## 2023-02-08 NOTE — ED NOTES
Report given to Sheri SAENZ in ICU. Endoscopy in progress in room 2 (ED). Will transfer to ICU post endoscopy.

## 2023-02-08 NOTE — ED TRIAGE NOTES
Patient arrives to triage from home with chief complaint of sudden onset of blood in emesis which started about an hour or two ago.  Patient had difficulty ambulating to triage bay, has chronic history of back pain.  Patient is diaphoretic, jaundiced as well.  Hypotensive in triage.

## 2023-02-08 NOTE — PROGRESS NOTES
[facility] - ICU    RN Progress Note:            Pertinent Assessments:      Please refer to flowsheet rows for full assessment     Bps very labile, titrating pressors to maintain MAP >65. Unable to reposition due to hemodynamic instability. Frequent oral suction for large amounts of lauro red blood. No urine output since 1200, total 80ml this shift, Dr. Villanueva updated. Total of 6 units PRBC given this shift and 1 unit platelets given, last Hgb 6.9.     At 1700, Bps began to drop with MAP<60, MD notified.Patient placed in trendelenburg and pressors titrated, Epi added.    At 1900 patient was brought to IR for TIPS procedure.         Mobility Level:     1 - Breathe      Barriers to Progression: Hemodynamic instability         Key Events - This Shift:     GUSTAVO SAT (Sedation Awakening Trial): For use ONLY if intubated    SAT Safety Screen FAILED   If FAILED why?    SAT Performed Not Applicable   If FAILED why?               Barriers to Discharge / Downgrade:     Multiple pressors, sedated, intubated         Point of Contact Update YES-OR-NO: Yes    Name:Amy    Summary of Conversation: Updated throughout this shift at the bedside.
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
## 2023-02-08 NOTE — ED NOTES
"Endoscopy complete. RN stated to writer they \"banded 3 varices and pulled the OG tube\". Preparing pt for transfer to ICU.   "

## 2023-02-08 NOTE — PROCEDURES
"  Pt. Name: Osmani Salazar  MRN:        [id number]    Procedure: Insertion of a  triple Lumen  5 fr  Bard SOLO (valved) Power PICC, Lot number [id number]    Indications: vasopressors    Contraindications : None    Procedure Details     Patient identified with 2 identifiers and \"Time Out\" conducted.  .     Central line insertion bundle followed: hand hygiene performed prior to procedure, site cleansed with cholraprep, hat, mask, sterile gloves,sterile gown worn, patient draped with maximum barrier head to toe drape, sterile field maintained.    The vein was assessed and found to be compressible and of adequate size. 2 ml 1% Lidocaine administered sq to the insertion site. A 5 Fr PICC was inserted into the basilic vein of the right arm with ultrasound guidance. One attempt(s) required to access vein.   Catheter threaded without difficulty. Good blood return noted.    Modified Seldinger Technique used for insertion.    The 8 sharps that are included in the PICC insertion kit were accounted for and disposed of in the sharps container prior to breakdown of the sterile field.    Catheter secured with Statlock, biopatch and Tegaderm dressing applied.    Findings:  Total catheter length  40 cm, with 0 cm exposed. Mid upper arm circumference is 32 cm. Catheter was flushed with 30 cc NS. Patient  tolerated procedure well.    Tip placement verified by 3CG.    CLABSI prevention brochure left at bedside.    Patient's primary RN notified PICC is ready for use.    Comments:          Silvia Meeks RN    Vascular Access - [facility]      "

## 2023-02-08 NOTE — PROGRESS NOTES
RT PROGRESS NOTE    VENT DAY# 1    CURRENT SETTINGS:   Vent Mode: Other (see comments) (VC AC)  FiO2 (%): 30 %  Resp Rate (Set): 24 breaths/min  Tidal Volume (Set, mL): 500 mL  PEEP (cm H2O): 5 cmH2O  Resp: 16      PATIENT PARAMETERS:  PIP 29  Pplat:  20  Pmean:  12  Compliance: 36  SBT: no      Secretions:  n/a  02 Sats:  100 %  BS: clear bilat.    ETT SIZE 7.5 Secured at 24 cm at teeth/gums    Respiratory Medications: none         NOTE / SHIFT SUMMARY:   Remains on mechanical ventilation, sedated, BS clear bilat, No weaning attempted.     Brennan Shea, RT

## 2023-02-08 NOTE — PHARMACY-ADMISSION MEDICATION HISTORY
Pharmacy Note - Admission Medication History    Pertinent Provider Information: none     ______________________________________________________________________    Prior To Admission (PTA) med list completed and updated in EMR.       PTA Med List   Medication Sig Last Dose     albuterol (PROAIR HFA/PROVENTIL HFA/VENTOLIN HFA) 108 (90 Base) MCG/ACT inhaler Inhale 2 puffs into the lungs every 4 hours as needed for shortness of breath, wheezing or cough Past Week     azelastine (ASTELIN) 0.1 % nasal spray Spray 1-2 sprays into both nostrils 2 times daily as needed for rhinitis Unknown     cetirizine (ZYRTEC) 10 MG tablet Take 10 mg by mouth daily as needed for allergies Unknown     ibuprofen (ADVIL/MOTRIN) 400 MG tablet Take 800 mg by mouth every 8 hours as needed for moderate pain (4-6) Past Week     methocarbamol (ROBAXIN) 750 MG tablet Take 750 mg by mouth 4 times daily as needed for muscle spasms Unknown     mometasone-formoterol (DULERA) 200-5 MCG/ACT inhaler Inhale 2 puffs into the lungs 2 times daily Past Week     multivitamin w/minerals (THERA-VIT-M) tablet Take 1 tablet by mouth daily Past Week       Information source(s): Family member and CareEverywhere/SureScripts  Method of interview communication: in-person    Summary of Changes to PTA Med List  New: none  Discontinued: none  Changed: none    Patient was asked about OTC/herbal products specifically.  PTA med list reflects this.    In the past week, patient estimated taking medication this percent of the time:  50-90% due to other.    Medication Affordability:  Not including over the counter (OTC) medications, was there a time in the past 12 months when you did not take your medications as prescribed because of cost?: No    Allergies were reviewed, assessed, and updated with the patient.      Patient did not bring any medications to the hospital and can't retrieve from home. No multi-dose medications are available for use during hospital stay.     The  information provided in this note is only as accurate as the sources available at the time of the update(s).    Thank you for the opportunity to participate in the care of this patient.    Francois Pinzon [facility]  2/8/2023 11:49 AM

## 2023-02-08 NOTE — CONSULTS
[facility] DIGESTIVE HEALTH CONSULT        Impression:     1. Etoh hepatitis / probable cirrhosis complicated by portal htn with varices, ascites  2. Esophageal variceal bleed, s/p EGD with banding around 4 am today.  Remains on maximal pressor support and hemoglobin has not risen despite multiple packed red cell transfusions  3. Large volume ascites: Likely exacerbated by needed fluid resuscitation.  Abdomen tense which may impact ventilation soon    He remains critically ill, picture concerning for ongoing bleeding.  MELD 24.  No prior documented history of encephalopathy.  CT enhanced did not show obvious PV or hepatic vein thrombosis.      Had a thorough discussion with patient's family regarding his current status      Recommendations:  1. May need repeat EGD sooner than later versus TIPS.  No melena but resistant hemoglobin and bloody return on oral suctioning continues  2. Continue octreotide  3. Continue ceftriaxone  4. Continue PPI for now  5. Dx / Tx paracentesis (ordered)  6. Continue to trend MELD labs  7. GI following closely.  Please call with change in status, questions or concerns     Reason for consult: Hematemesis, etoh liver disease      HPI: 47 year old male with history of etoh misuse and dependence who was admitted with hematemesis. Patient intubated. Unable to elicit subjective.   Spoke with nurse, intensivist and family.  He has used significant ETOH for 20 years.  No prior awareness of liver disease.  His family notes that he recently appeared yellow.  His abdomen was becoming more distended while the rest of his body was losing mass.      Subjective update:  Intubated so unable to elicit subjective.  Oral suction continues to be bloody throughout the day.  No melena.  Increasing abdominal distension.  Hemoglobin unchanged despite multiple (on #6) packed red cell transfusions.    Objective:                  Vital signs in last 24 hrs;  Temp:  [97.1  F (36.2  C)-99.2  F (37.3  C)] 97.8  F (36.6   C)  Pulse:  [] 121  Resp:  [0-29] 29  BP: ()/(33-70) 96/46  FiO2 (%):  [30 %-50 %] 30 %  SpO2:  [83 %-100 %] 100 %    On maximum pressor support.      Physical Exam:   General: Comfortable appearing. Awake.   Cardiovascular: Regular rate. No edema  Chest: Non-labored breathing. Symmetric chest rise.   Abdomen: soft, non-tender, non-distended  Neurologic: Alert, no focal defects.     PMH:  Asthma  ETOH misuse / dependence    SH:  10+ shots of hard alcohol + additional etoh beverages daily (significant etoh use for 20 yrs)    FH:  Noncontributory    ROS:   Unable to elicit      Current Labs:  CBC RESULTS: Recent Labs   Lab Test 02/08/23  1352 02/08/23  0908 02/08/23  0559 02/08/23  0405 02/07/23  2150 10/15/22  0740   WBC  --   --   --  11.9* 10.6 5.1   RBC  --   --   --  2.02* 2.52* 3.81*   HGB 6.6* 6.0* 6.3* 6.9* 8.7* 12.9*   HCT  --   --   --  21.5* 26.5* 35.7*   MCV  --   --   --  106* 105* 94   MCH  --   --   --  34.2* 34.5* 33.9*   MCHC  --   --   --  32.1 32.8 36.1   RDW  --   --   --  18.2* 14.9 14.4   PLT  --   --   --  78* 51* 64*        CMP Results:   Recent Labs   Lab Test 02/08/23  1515 02/08/23  1503 02/08/23  1352 02/08/23  0500 02/08/23  0405 02/07/23  2150 10/15/22  0740   NA  --   --  137  --  142 140 138   POTASSIUM  --   --  4.2  --  4.8 3.7 4.0   CHLORIDE  --   --  94*  --  104 104 107   CO2  --   --  19*  --  13* 24 24   ANIONGAP  --   --  24*  --  25* 12 7   * 123* 493*   < > 96 120* 85   BUN  --   --  7.0  --  8.9 7.5 6*   CR  --   --  1.18*  --  1.18* 0.90 0.66   BILITOTAL  --   --   --   --  6.2* 8.1* 5.0*   ALKPHOS  --   --   --   --  85 124 123   ALT  --   --   --   --  52* 60* 59   AST  --   --   --   --  168* 180* 149*    < > = values in this interval not displayed.        INR Results:   Recent Labs   Lab Test 02/07/23  2150   INR 2.31*          Imaging:  CT reviewed      A total of 55 minutes were spent reviewing chart, interacting with family and staff, examining  patient, placing orders and generating consult          Jonathan Rudd MD  Thank you for the opportunity to participate in the care of this patient.   Please feel free to call me with any questions or concerns.  Phone number [phone].          2/8/2023 3:34 PM      ADDENDUM:  In light of ongoing bleeding, maximum pressor support and resistant hemoglobin, factoring in that endoscopic therapy was already attempted, favor pursuing TIPS as the next step if agreeable per IR.  Discussed with patient's wife, reviewed rationale and potential complications, and she is agreeable with the plan.    Jonathan Rudd MD

## 2023-02-08 NOTE — PROGRESS NOTES
Vent Mode: Other (see comments) (VC AC)  FiO2 (%): 30 %  Resp Rate (Set): 24 breaths/min  Tidal Volume (Set, mL): 500 mL  PEEP (cm H2O): 5 cmH2O  Resp: 29    BP 93/44   Pulse (!) 121   Temp 97.7  F (36.5  C) (Oral)   Resp 29   Ht 1.829 m (6')   Wt 102.3 kg (225 lb 9.6 oz)   SpO2 100%   BMI 30.60 kg/m      Continues on mechanical ventilation, sedated. RT to monitor.

## 2023-02-08 NOTE — ED NOTES
Pt transferred to ICU on monitor accompanied by RT, RN and tech. All blood products completed as ordered with no reaction. Propofol at 25 mcg/kg/min.
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
## 2023-02-08 NOTE — PLAN OF CARE
Problem: Mechanical Ventilation Invasive  Goal: Optimal Nutrition Delivery  Outcome: Not Progressing   Goal Outcome Evaluation:     Propofol and Sodium Bicarb providing for calorie needs.  No protein provisions.  No feeding tube per GI.

## 2023-02-08 NOTE — PROGRESS NOTES
ICU Update Note    Pt still hypotensive, still on 3 pressors  Consulted IR for TIPS keyonna  D/w VENESSA Villanueva DO  Pulmonary and Critical Care Attending  pgr [phone]

## 2023-02-09 NOTE — PROCEDURES
[facility]    Procedure: Imaging Procedure Note    Date/Time: 2023 9:32 PM  Performed by: Sade Heart MD  Authorized by: Sade Heart MD       UNIVERSAL PROTOCOL   Site Marked: Yes  Prior Images Obtained and Reviewed:  Yes  Required items: Required blood products, implants, devices and special equipment available    Patient identity confirmed:  Verbally with patient  Patient was reevaluated immediately before administering moderate or deep sedation or anesthesia  Confirmation Checklist:  Patient's identity using two indicators, relevant allergies, procedure was appropriate and matched the consent or emergent situation and correct equipment/implants were available  Time out: Immediately prior to the procedure a time out was called    Universal Protocol: the Joint Commission Universal Protocol was followed    Preparation: Patient was prepped and draped in usual sterile fashion      SEDATION    Patient Sedated: No    See dictated procedure note for full details.    PROCEDURE  Describe Procedure: Successful placement of a TIPS, there is no filling of esophageal varices on post TIPS imaging    Pre TIPS HVP mmHg  Post TIPS HVP mmHg  Patient Tolerance:  Patient tolerated the procedure well with no immediate complications  Length of time physician/provider present for 1:1 monitoring during sedation: 0

## 2023-02-09 NOTE — ANESTHESIA PREPROCEDURE EVALUATION
Anesthesia Pre-Procedure Evaluation    Patient: Osmani Salazar   MRN: [id number] : 1975        Procedure : Procedure(s):  ESOPHAGOGASTRODUODENOSCOPY (EGD) with esophageal variceal banding          No past medical history on file.   Past Surgical History:   Procedure Laterality Date     PICC TRIPLE LUMEN PLACEMENT  2023           Allergies   Allergen Reactions     Seasonal Allergies       Social History     Tobacco Use     Smoking status: Not on file     Smokeless tobacco: Not on file   Substance Use Topics     Alcohol use: Not on file      Wt Readings from Last 1 Encounters:   23 102.3 kg (225 lb 9.6 oz)        Anesthesia Evaluation   Pt has not had prior anesthetic         ROS/MED HX  ENT/Pulmonary: Comment: Respiratory failure - neg pulmonary ROS     Neurologic:     (+) delerium, resolved No.     Cardiovascular: Comment: Shock, max pressors, bicarbonate drip - neg cardiovascular ROS     METS/Exercise Tolerance: >4 METS    Hematologic:     (+) anemia, history of blood transfusion (getting multiple PRBCs, platelets, ),     Musculoskeletal:  - neg musculoskeletal ROS     GI/Hepatic:  - neg GI/hepatic ROS   (+) esophageal disease, Varices, liver disease (cirrhosis),     Renal/Genitourinary:  - neg Renal ROS     Endo:  - neg endo ROS     Psychiatric/Substance Use:  - neg psychiatric ROS     Infectious Disease:  - neg infectious disease ROS     Malignancy:  - neg malignancy ROS     Other:  - neg other ROS          Physical Exam    Airway   unable to assess          Respiratory Devices and Support    ETT:      Dental    unable to assess        Cardiovascular          Rhythm and rate: tachycardia     Pulmonary           (+) decreased breath sounds           OUTSIDE LABS:  CBC:   Lab Results   Component Value Date    WBC 11.9 (H) 2023    WBC 10.6 2023    HGB 6.9 (LL) 2023    HGB 6.6 (LL) 2023    HCT 21.5 (L) 2023    HCT 26.5 (L) 2023    PLT 60 (L) 2023    PLT 78  (L) 02/08/2023     BMP:   Lab Results   Component Value Date     02/08/2023     02/08/2023    POTASSIUM 4.2 02/08/2023    POTASSIUM 4.8 02/08/2023    CHLORIDE 94 (L) 02/08/2023    CHLORIDE 104 02/08/2023    CO2 19 (L) 02/08/2023    CO2 13 (L) 02/08/2023    BUN 7.0 02/08/2023    BUN 8.9 02/08/2023    CR 1.18 (H) 02/08/2023    CR 1.18 (H) 02/08/2023     (H) 02/08/2023     (H) 02/08/2023     COAGS:   Lab Results   Component Value Date    INR 2.31 (H) 02/07/2023     POC: No results found for: BGM, HCG, HCGS  HEPATIC:   Lab Results   Component Value Date    ALBUMIN 1.1 (L) 02/08/2023    PROTTOTAL 4.6 (L) 02/08/2023    ALT 52 (H) 02/08/2023     (H) 02/08/2023    ALKPHOS 85 02/08/2023    BILITOTAL 6.2 (H) 02/08/2023    CHARLOTTE 96 (H) 02/08/2023     OTHER:   Lab Results   Component Value Date    PH 7.00 (LL) 02/08/2023    LACT >24.0 (HH) 02/08/2023    A1C 4.7 10/15/2022    ADELAIDE 4.4 (LL) 02/08/2023    MAG 2.3 02/13/2020    LIPASE 78 (H) 11/19/2018    TSH 6.15 (H) 10/15/2022    T4 1.40 10/15/2022       Anesthesia Plan    ASA Status:  5, emergent    NPO Status:  NPO Appropriate    Anesthesia Type: General.     - Airway: ETT         Techniques and Equipment:     - Lines/Monitors: Arterial Line     Consents    Anesthesia Plan(s) and associated risks, benefits, and realistic alternatives discussed. Questions answered and patient/representative(s) expressed understanding.    - Discussed:     - Discussed with:  Spouse      - Extended Intubation/Ventilatory Support Discussed: Yes.      - Patient is DNR/DNI Status: No    Use of blood products discussed: Yes.     - Discussed with: Legal guardian.     Postoperative Care    Pain management: IV analgesics, Multi-modal analgesia.   PONV prophylaxis: Ondansetron (or other 5HT-3), Dexamethasone or Solumedrol, Droperidol or Haldol     Comments:                Anthony Mcneal MD

## 2023-02-09 NOTE — PROGRESS NOTES
[facility]  [facility]   Follow up    Osmani Salazar   MRN: [id number]  : 1975   DOA: 2023   CC: ARF, severe acidosis      Assessment and Plan:  47 year old male    1. ARF; secondary to hemodynamics==>ATN; poor urine output; on CRRT; continue for now  2. Severe acidosis; treating with CRRT and high dose bicarb gtt; following gases and can back off gtt if achieving good values  3. HoTN; shock due to hemorrhage/hypovolemia; on pressors; also has underlying liver disease so low bp due to vasodilation at a baseline; on abx to cover any infection  4. Anemia; acute loss due to hematemesis due to varices  5. Coagulopathy; s/p FFP and cryo  6. resp failure; on vent  7. 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 ETOH abuse; on meds to treat any withdrawal  9. Hypernatremia; secondary to blood products; follow with ongoing CRRT  10. Hyperphos; noted; no clinical significance in this setting    Subjective  Pt new to me; chart and meds reviewed; d/w ICU team and also bedside nurse  On less pressors; on CRRT and no net UF; has received extensive blood products  Objective    Vital signs in last 24 hours  Temp:  [94.8  F (34.9  C)-98  F (36.7  C)] 97.3  F (36.3  C)  Pulse:  [101-138] 116  Resp:  [0-32] 32  BP: ()/(40-72) 138/63  MAP:  [43 mmHg-101 mmHg] 79 mmHg  Arterial Line BP: ()/(32-59) 115/59  FiO2 (%):  [30 %-80 %] 80 %  SpO2:  [91 %-100 %] 96 %      Intake/Output last 3 shifts  I/O last 3 completed shifts:  In: 85124.81 [I.V.:42449.81]  Out: 2153 [Urine:223; Other:1900; Blood:30]  Intake/Output this shift:  I/O this shift:  In: 2742.85 [I.V.:1701.85]  Out: 972 [Urine:21; Other:951]    Physical Exam  Intubated and sedated; some facial edema  CV: RRR without murmur or rub  Lung:coarse sounds anteriorly  Ab: soft and NT; not distended; normal bs  Ext: some edema and well  perfused  Skin; no rash    Pertinent Labs     Last Renal Panel:  Sodium   Date Value Ref Range Status   02/09/2023 151 (H) 136 - 145 mmol/L Final     Potassium   Date Value Ref Range Status   02/09/2023 4.8 3.4 - 5.3 mmol/L Final   10/15/2022 4.0 3.4 - 5.3 mmol/L Final     Chloride   Date Value Ref Range Status   02/09/2023 102 98 - 107 mmol/L Final   10/15/2022 107 94 - 109 mmol/L Final     Carbon Dioxide (CO2)   Date Value Ref Range Status   02/09/2023 12 (L) 22 - 29 mmol/L Final   10/15/2022 24 20 - 32 mmol/L Final     Anion Gap   Date Value Ref Range Status   02/09/2023 37 (H) 7 - 15 mmol/L Final   10/15/2022 7 3 - 14 mmol/L Final     Glucose   Date Value Ref Range Status   02/09/2023 110 (H) 70 - 99 mg/dL Final   10/15/2022 85 70 - 99 mg/dL Final     GLUCOSE BY METER POCT   Date Value Ref Range Status   02/09/2023 92 70 - 99 mg/dL Final     Urea Nitrogen   Date Value Ref Range Status   02/09/2023 8.7 6.0 - 20.0 mg/dL Final   10/15/2022 6 (L) 7 - 30 mg/dL Final     Creatinine   Date Value Ref Range Status   02/09/2023 2.35 (H) 0.67 - 1.17 mg/dL Final     GFR Estimate   Date Value Ref Range Status   02/09/2023 34 (L) >60 mL/min/1.73m2 Final     Comment:     eGFR calculated using 2021 CKD-EPI equation.   02/13/2020 >60 >60 mL/min/1.73m2 Final     Calcium   Date Value Ref Range Status   02/09/2023 6.5 (L) 8.6 - 10.0 mg/dL Final     Phosphorus   Date Value Ref Range Status   02/09/2023 7.9 (H) 2.5 - 4.5 mg/dL Final     Albumin   Date Value Ref Range Status   02/09/2023 2.5 (L) 3.5 - 5.2 g/dL Final   10/15/2022 2.6 (L) 3.4 - 5.0 g/dL Final     Recent Labs   Lab 02/09/23  0510 02/09/23  0350 02/09/23  0044 02/08/23  2151 02/08/23  1645   HGB 7.7* 9.2* 8.7* 9.8* 6.9*          I reviewed all lab results  Tabitha Blanco MD

## 2023-02-09 NOTE — ANESTHESIA CARE TRANSFER NOTE
Patient: Osmani Salazar    Procedure: * No procedures listed *       Diagnosis: * No pre-op diagnosis entered *  Diagnosis Additional Information: No value filed.    Anesthesia Type:   General     Note:    Oropharynx: endotracheal tube in place  Level of Consciousness: iatrogenic sedation        Dentition: dentition unchanged  Vital Signs Stable: post-procedure vital signs reviewed and stable  Report to RN Given: handoff report given  Patient transferred to: ICU  Comments: ICU RN present for entirety of case.  Report given in IR.  ICU team/RT to  Transport patient back to ICU monitored, on portable vent.  VSS throughout case.  ICU Handoff: Call for PAUSE to initiate/utilize ICU HANDOFF, Identified Patient, Identified Responsible Provider, Reviewed the Pertinent Medical History, Discussed Surgical Course, Reviewed Intra-OP Anesthesia Management and Issues during Anesthesia, Set Expectations for Post Procedure Period and Allowed Opportunity for Questions and Acknowledgement of Understanding      Vitals:  Vitals Value Taken Time   /59 02/08/23 2114   Temp     Pulse 109 02/08/23 2114   Resp 16 02/08/23 2114   SpO2 100 % 02/08/23 2114       Electronically Signed By: PATRICK Arriaga CRNA  February 8, 2023  9:16 PM

## 2023-02-09 NOTE — ANESTHESIA POSTPROCEDURE EVALUATION
Patient: Osmani Salazar    Procedure: * No procedures listed *       Anesthesia Type:  General    Note:  Disposition: ICU            ICU Sign Out: Anesthesiologist/ICU physician sign out WAS performed (face to face)   Postop Pain Control: Uneventful            Sign Out: Well controlled pain   PONV: No   Neuro/Psych: Uneventful            Sign Out: Acceptable/Baseline neuro status   Airway/Respiratory: Uneventful            Sign Out: AIRWAY IN SITU/Resp. Support   CV/Hemodynamics: Uneventful            Sign Out: Detailed CV status               Blood Pressure: Pressors               Rate/Rhythm: Tachycardia               Perfusion:  Adequate perfusion indices   Other NRE: NONE   DID A NON-ROUTINE EVENT OCCUR? No           Last vitals:  Vitals:    02/08/23 1830 02/08/23 1845 02/08/23 2114   BP: 103/49 104/50 116/59   Pulse: (!) 124 (!) 124 109   Resp: 10 10 16   Temp:      SpO2: 99% 94% 100%       Electronically Signed By: Abelardo Eckert MD  February 8, 2023  9:25 PM

## 2023-02-09 NOTE — PROGRESS NOTES
Critical Care Progress Note      02/09/2023    Name: Osmani Salazar MRN#: [id number]   Age: 47 year old YOB: 1975     Hsptl Day# 1  ICU DAY #    MV DAY #             Problem List:   Principal Problem:    Alcoholic intoxication with complication (H)  Active Problems:    Hemorrhagic shock (H)    Alcoholic liver damage (H)    Hematemesis with nausea  Lactic acidosis  Acute renal failure  Acute respiratory failure  Rhabdomyolysis  Shock liver  Hypoglycemia    Clinically Significant Risk Factors         # Hypernatremia: Highest Na = 151 mmol/L in last 2 days, will monitor as appropriate   # Hypercalcemia: corrected calcium is >10.1, will monitor as appropriate   # Anion Gap Metabolic Acidosis: Highest Anion Gap = 37 mmol/L in last 2 days, will monitor and treat as appropriate  # Hypoalbuminemia: Lowest albumin = 1.1 g/dL at 2/8/2023  4:45 PM, will monitor as appropriate   # Thrombocytopenia: Lowest platelets = 46 in last 2 days, will monitor for bleeding         # Obesity: Estimated body mass index is 30.6 kg/m  as calculated from the following:    Height as of this encounter: 1.829 m (6').    Weight as of this encounter: 102.3 kg (225 lb 9.6 oz)., PRESENT ON ADMISSION                           Summary/Hospital Course:     Osmani Salazar is a 47 year old male with PMHx of alcohol abuse , alcohol liver disease, asthma who presents with hematemesis and acute blood loss anemia s/p intubation 2/8.  Underwent EGD with banding of varices but continued to bleed so he had to go to IR for emergent TIPS.  Refractory acidosis.  Initiated on CRRT.  Refractory hypotension requiring 5 pressors      Assessment and plan :       I have personally reviewed the daily labs, imaging studies, cultures and discussed the case with referring physician and consulting physicians.     My assessment and plan by system for this patient is as follows:    Neurology/Psychiatry:   Sedated with Precedex and fentanyl  Alcohol abuse.  High risk  for withdrawing.  I am going to scheduled Ativan just to make sure he does not have any underlying seizures while he is withdrawing.    Cardiovascular:   Hemorrhagic shock in a patient with liver cirrhosis.  At the beginning of the shift on 5 pressors.  Currently epinephrine and angiotensin II are off.  On to wean norepinephrine and after that we can try to take down Lucas-Synephrine    Aggressively fluid resuscitated.  Status post multiple transfusions      Pulmonary/Ventilator Management:   Acute respiratory failure secondary to hemorrhagic shock  Quite hypoxic.  Requiring 80% FiO2  Tidal volume currently at 480 which is 6 mL/kg  Plateau pressure is only 19 some going to go up to 7 mL/kg.      GI and Nutrition :   For cirrhosis secondary to alcohol abuse.  Variceal bleeding.  Status post banding and emergent TIPS    Continue octreotide and pantoprazole  Shock liver    Renal/Fluids/Electrolytes:   Acute renal failure  Currently on CRRT    - monitor function and electrolytes as needed with replacement per ICU protocols.  - generally avoid nephrotoxic agents such as NSAID, IV contrast unless specifically required  - adjust medications as needed for renal clearance  - follow I/O's as appropriate.    Infectious Disease:   Continue vancomycin and Zosyn for now.  High risk for infectious complications secondary to bacterial translocation      Endocrine:   Hypoglycemic.  This likely secondary to liver dysfunction/failure.  Switch D10 to D20  Plan  - ICU insulin protocol, goal sugar <180      Hematology/Oncology:   Acute blood loss anemia status posttransfusion  Coagulopathy.  This morning he received 4 units of FFP's and 2 5 packs of cryo        ICU Prophylaxis:   1. DVT: mechanical  2. VAP: HOB 30 degrees, chlorhexidine rinse  3. Stress Ulcer: PPI  4. Restraints: Nonviolent soft two point restraints required and necessary for patient safety and continued cares and good effect as patient continues to pull at necessary  lines, tubes despite education and distraction. Will readdress daily.     Category: Non-violent   Type of Restraint: Soft limb x2.   Behavior: Pulling at IVand garcia catheter tubings.   Root cause of behavior: Critical illness.   Less-restrictive methods that have failed: Redirection, reorientation. 1:1 NA at bedside.   Response to restraint: Not actively pulling atcurrent restraints.   Criteria for release from restraint: Responds to redirection. Leaves medical devices in place.             Key Medications:       artificial tears   Both Eyes BID     chlorhexidine  15 mL Mouth/Throat Q12H     hydrocortisone sodium succinate PF  50 mg Intravenous Q6H     influenza quadrivalent (PF) vacc  0.5 mL Intramuscular Prior to discharge     insulin aspart  1-6 Units Subcutaneous Q4H ADAN     piperacillin-tazobactam  3.375 g Intravenous Q8H     sodium chloride (PF)  3 mL Intracatheter Q8H     thiamine  200 mg Intravenous Daily     vancomycin  1,000 mg Intravenous Q12H       angiotensin II (GIAPREZA) ADULT infusion 2.5mg/250 mL NS Stopped (02/09/23 0926)     CRRT replacement solution 12.5 mL/kg/hr (02/09/23 0820)     CRRT replacement solution 200 mL/hr (02/09/23 0032)     dexmedetomidine 0.6 mcg/kg/hr (02/09/23 1100)     dextrose       CRRT replacement solution 12.5 mL/kg/hr (02/09/23 0818)     EPINEPHrine Stopped (02/09/23 0830)     fentaNYL 75 mcg/hr (02/09/23 1100)     - MEDICATION INSTRUCTIONS -       norepinephrine 0.1 mcg/kg/min (02/09/23 1139)     octreotide (sandoSTATIN) infusion ADULT 50 mcg/hr (02/09/23 1100)     pantoprazole (PROTONIX) infusion ADULT/PEDS GREATER than or EQUAL to 45 kg 8 mg/hr (02/09/23 1100)     phenylephrine 6 mcg/kg/min (02/09/23 1114)     propofol Stopped (02/08/23 1730)     sodium bicarbonate 75 mL/hr (02/09/23 1100)     vasopressin 2.4 Units/hr (02/09/23 1100)               Physical Examination:   Temp:  [94.8  F (34.9  C)-98  F (36.7  C)] 97.9  F (36.6  C)  Pulse:  [101-138] 119  Resp:  [0-32]  32  BP: ()/(40-72) 121/57  MAP:  [43 mmHg-101 mmHg] 70 mmHg  Arterial Line BP: ()/(32-61) 96/53  FiO2 (%):  [30 %-80 %] 80 %  SpO2:  [91 %-100 %] 96 %    Intake/Output Summary (Last 24 hours) at 2/9/2023 1159  Last data filed at 2/9/2023 1100  Gross per 24 hour   Intake 63228.45 ml   Output 4728 ml   Net 15085.45 ml     Wt Readings from Last 4 Encounters:   02/08/23 102.3 kg (225 lb 9.6 oz)   10/16/22 82.1 kg (181 lb)   10/13/22 90.7 kg (200 lb)     Arterial Line BP: ()/(32-61) 96/53  MAP:  [43 mmHg-101 mmHg] 70 mmHg  BP - Mean:  [] 82  Vent Mode: CMV/AC  (Continuous Mandatory Ventilation/ Assist Control)  FiO2 (%): 80 %  Resp Rate (Set): 32 breaths/min  Tidal Volume (Set, mL): 480 mL  PEEP (cm H2O): 5 cmH2O  Resp: (!) 32    Recent Labs   Lab 02/09/23  0510 02/09/23  0147 02/08/23  2241 02/08/23  2151   PH 7.21*  7.20* 7.15* 7.11* 6.99*  6.99*   PCO2 32* 31* 33* 37   PO2 95* 70* 77* 94*   HCO3 13* 11* 11* 9*   O2PER 0.8 0.7 0.5 0.5       GEN: no acute distress   HEENT: head ncat, sclera anicteric, OP patent, trachea midline   PULM: unlabored synchronous with vent, clear anteriorly    CV/COR: RRR S1S2 no gallop,  No rub, no murmur  ABD: Protuberant, decreased bowel sounds  EXT: Peripheral edema, upper extremity is worse than lower extremities  NEURO: Sedated  SKIN: no obvious rash  LINES: clean, dry intact         Data:   All data and imaging reviewed     ROUTINE ICU LABS (Last four results)  CMP  Recent Labs   Lab 02/09/23  1117 02/09/23  1058 02/09/23  0953 02/09/23  0846 02/09/23  0626 02/09/23  0510 02/09/23  0440 02/09/23  0350 02/09/23  0042 02/08/23  2151 02/08/23  1832 02/08/23  1645 02/08/23  1503 02/08/23  1352 02/08/23  0500 02/08/23  0405   NA  --   --   --   --   --  151*  --  150*  --  146*  --   --   --  137  --  142   POTASSIUM  --   --   --   --   --  4.8  --  4.7  --  4.9  --   --   --  4.2  --  4.8   CHLORIDE  --   --   --   --   --  102  --  102  --  100  --   --   --   94*  --  104   CO2  --   --   --   --   --  12*  --  12*  --  9*  --   --   --  19*  --  13*   ANIONGAP  --   --   --   --   --  37*  --  36*  --  37*  --   --   --  24*  --  25*   * 69* 92 91   < > 110*   < > 69*   < > 138*  159*   < >  --    < > 493*   < > 96   BUN  --   --   --   --   --  8.7  --  9.0  --  10.1  --   --   --  7.0  --  8.9   CR  --   --   --   --   --  2.35*  --  2.38*  --  2.36*  --   --   --  1.18*  --  1.18*   GFRESTIMATED  --   --   --   --   --  34*  --  33*  --  34*  --   --   --  77  --  77   ADELAIDE  --   --   --   --   --  6.5*  --  6.8*  --  6.5*  --   --   --  4.4*  --  7.1*   MAG  --   --   --   --   --   --   --  1.7  --   --   --   --   --   --   --   --    PHOS  --   --   --   --   --   --   --  7.9*  --   --   --   --   --   --   --   --    PROTTOTAL  --   --   --   --   --  3.7*  --  3.8*  --  3.2*  --   --   --   --   --  4.6*   ALBUMIN  --   --   --   --   --  2.5*  --  2.3*  --  1.6*  --  1.1*  --   --   --  2.0*   BILITOTAL  --   --   --   --   --  5.9*  --  6.4*  --  4.8*  --   --   --   --   --  6.2*   ALKPHOS  --   --   --   --   --  291*  --  284*  --  115  --   --   --   --   --  85   AST  --   --   --   --   --  13,881*  --  14,410*  --  6,778*  --   --   --   --   --  168*   ALT  --   --   --   --   --  1,321*  --  1,455*  --  645*  --   --   --   --   --  52*    < > = values in this interval not displayed.     CBC  Recent Labs   Lab 02/09/23  0510 02/09/23  0350 02/09/23  0044 02/08/23  2151 02/08/23  1645 02/08/23  0559 02/08/23  0405 02/07/23 2150   WBC  --  18.3*  --  15.1*  --   --  11.9* 10.6   RBC  --  2.99*  --  3.15*  --   --  2.02* 2.52*   HGB 7.7* 9.2* 8.7* 9.8* 6.9*   < > 6.9* 8.7*   HCT  --  27.1*  --  30.2*  --   --  21.5* 26.5*   MCV  --  91  --  96  --   --  106* 105*   MCH  --  30.8  --  31.1  --   --  34.2* 34.5*   MCHC  --  33.9  --  32.5  --   --  32.1 32.8   RDW  --  20.7*  --  20.6*  --   --  18.2* 14.9   PLT  --  46*  --  53* 60*  --  78* 51*     < > = values in this interval not displayed.     INR  Recent Labs   Lab 02/09/23  0510 02/08/23  2201 02/07/23  2150   INR 7.21* 5.18* 2.31*     Arterial Blood Gas  Recent Labs   Lab 02/09/23  0510 02/09/23  0147 02/08/23  2241 02/08/23  2151   PH 7.21*  7.20* 7.15* 7.11* 6.99*  6.99*   PCO2 32* 31* 33* 37   PO2 95* 70* 77* 94*   HCO3 13* 11* 11* 9*   O2PER 0.8 0.7 0.5 0.5       All cultures:  No results for input(s): CULT in the last 168 hours.  Recent Results (from the past 24 hour(s))   US Paracentesis without Albumin    Narrative    EXAM:  1. PARACENTESIS  2. ULTRASOUND GUIDANCE  LOCATION: [facility]  DATE/TIME: 2/8/2023 5:09 PM    INDICATION: Ascites.    PROCEDURE: Informed consent obtained through telephone with the patient's wife. Time out performed. An accessible fluid pocket was observed in the right upper quadrant near the liver. The abdomen was prepped and draped in a sterile fashion. 5 mL of 1%   lidocaine was infused into local soft tissues. A 5 Korean catheter system was introduced into the abdominal ascites under ultrasound guidance. Right upper quadrant fluid was evacuated to completion. Attention was then turned to the left abdomen where   there was a substantial left lower quadrant fluid pocket. The abdomen was again prepped and draped in a sterile fashion and 5 mm of lidocaine 1% was infused into the overlying soft tissues. A 5 Korean catheter system was introduced into left lower   quadrant abdominal ascites under ultrasound guidance.    Total of 3.15 liters of dark yellow fluid were removed and sent to lab if requested.    Patient tolerated procedure well.    Ultrasound imaging was obtained and placed in the patient's permanent medical record.      Impression    IMPRESSION:    Status post ultrasound-guided paracentesis.    Reference CPT Code: 37874   IR Transven Intrahepatic Portosyst Shunt    Narrative    LOCATION: [facility]  DATE:  2/8/2023    PROCEDURE: TRANSJUGULAR INTRAHEPATIC PORTOSYSTEMIC SHUNT (TIPS) PLACEMENT.   1.  Ultrasound-guided access of the right internal jugular vein. A permanent image was stored.  2.  Digital subtraction right hepatic venography.  3.  Digital subtraction wedge CO2 portal venography.  4.  Transjugular intrahepatic portosystemic needle passage.  5.  Digital subtraction portal venography.  6.  Portosystemic pre-TIPS intravenous pressure monitoring.  7.  Transhepatic portosystemic angioplasty (8mm).  8.  Placement of a TIPS (7 cm x 2 cm Viatorr).  9.  Portosystemic post TIPS intravenous pressure monitoring.    INTERVENTIONAL RADIOLOGIST: Sade Heart MD    INDICATION: 47 year old male with alcoholic cirrhosis noted to have life-threatening esophageal variceal bleeding resistant to endoscopic therapy, plan for image guided TIPS.    CONSENT: The risks, benefits and alternatives of the procedure were discussed with the patient's wife in detail. All questions were answered. Informed consent was given to proceed with the procedure.    MODERATE SEDATION: General endotracheal anesthesia.    CONTRAST: 90 cc Omnipaque    FLUOROSCOPIC TIME: 24.5 minutes.  RADIATION DOSE: Air Kerma: 2907 mGy.    COMPLICATIONS: No immediate complications.    UNIVERSAL PROTOCOL: The operative site was marked and any prior imaging was reviewed. Required items including blood products, implants, devices and special equipment was made available. Patient identity was confirmed either verbally, with demographic   information, hospital assigned identification or other identification markers. A timeout was performed immediately prior to the procedure.    STERILE BARRIER TECHNIQUE: Maximum sterile barrier technique was used. Cutaneous antisepsis was performed at the operative site with application of 2% chlorhexidine and large sterile drape. Prior to the procedure, the  and assistant performed   hand hygiene and wore hat, mask, sterile gown,  and sterile gloves during the entire procedure.    PROCEDURE/TECHNIQUE:    Using real-time sonographic guidance, access was achieved into the right internal jugular vein and conversion was made for a 10 [place]i vascular sheath. A catheter and wire were manipulated into the distal right hepatic vein, and digital subtraction   venography was obtained. Over wire exchange was made for a balloon occlusion catheter through which digital subtraction CO2 portal venography was obtained.    Over wire exchange was made for a Rosch-Uchida TIPS set with the sheath advanced into the mid right hepatic vein. The TIPS cannula was directed anteriorly and 3 transhepatic passes were performed gaining access into the right portal vein. A stiff wire   was manipulated through the Rosch-Uchida catheter and over wire exchange was made for a marker pigtail catheter which was positioned in the main portal vein. Digital subtraction portal venography was obtained. Intravenous pressure measurements were   obtained within the main portal vein and right atrium, and a portosystemic gradient was calculated.    Based on the pressure gradient findings, the transhepatic tract was predilated over an Amplatz wire with an 8 mm x 4 cm balloon. The outer sheath was advanced over the wire through the dilated pathway and into the main portal vein. A Viatorr stent (7 cm   covered by 2 cm uncovered) was placed through the sheath, and the sheath was withdrawn into the right atrium. The stent was deployed and angioplastied to 8 mm along its entire length. Over wire exchange was made for the marking pigtail catheter, and   completion digital subtraction portal venography was obtained. Completion pressure measurements were obtained within the main portal vein and right atrium, and the portosystemic gradient was calculated.    FINDINGS:  Ultrasound demonstrates an anechoic and compressible right internal jugular vein.    The digital subtraction right hepatic  venography shows the catheter to lie within the periphery of the right hepatic vein. The CO2 venography shows a normal caliber of portal vein with antegrade flow. Images obtained following transhepatic portosystemic   access show access has been achieved into the right portal vein approximately 2.6 cm distal to the portal venous confluence.    Following angioplasty and placement of the TIPS the stent is shown to be widely patent.     Pre-TIPS portosystemic gradient: 24 mmHg (RA 2, PV 26)    Post TIPS portosystemic gradient: 4 mmHg (RA 7, PV 11)      Impression    IMPRESSION:    1.  Successful placement of a TIPS extending from the right hepatic vein to the right portal vein, as detailed above.  2.  Significant improvement of the portosystemic gradient, measuring 24 mmHg preintervention and 4 mmHg post intervention.   3.  There is no filling of esophageal varices on post TIPS portal venography.         Billing: This patient is critically ill: Yes. Total critical care time today 60 min exclusive of procedures or teaching.  Managing hemorrhagic shock, multiple pressors, respiratory failure requiring mechanical ventilation and renal failure.  High risk for dying.

## 2023-02-09 NOTE — PLAN OF CARE
Goal Outcome Evaluation:    Problem: Mechanical Ventilation Invasive  Goal: Absence of Ventilator-Induced Lung Injury  Intervention: Facilitate Lung-Protection Measures  Flowsheets (Taken 2/9/2023 4212)  Lung Protection Measures:   ventilator waveforms monitored   ventilator synchrony promoted   lung compliance monitored   plateau/inspiratory pressure monitored          Valencia Thomson, RT

## 2023-02-09 NOTE — PROGRESS NOTES
RT PROGRESS NOTE    VENT DAY# 2    CURRENT SETTINGS:   Vent Mode: CMV/AC  (Continuous Mandatory Ventilation/ Assist Control)  FiO2 (%): 80 %  Resp Rate (Set): 32 breaths/min  Tidal Volume (Set, mL): 480 mL  PEEP (cm H2O): 5 cmH2O  Resp: 20      PATIENT PARAMETERS:  PIP 24  Pplat:  24  Pmean:  12  02 Sats:  93%  BS: clear, Sxn scent white to none.     ETT SIZE 7.5 Secured at 24 cm at teeth/gums       pH 7.15; pCO2 31; pO2 70; HCO3 11, %O2 Sat 91, BE -18.       Sujatha Morton, RT

## 2023-02-09 NOTE — IR NOTE
Vitals:    02/08/23 1830 02/08/23 1845 02/08/23 2114 02/08/23 2147   BP: 103/49 104/50 116/59 109/53   BP Location:       Pulse: (!) 124 (!) 124 109 119   Resp: 10 10 16    Temp:       TempSrc:       SpO2: 99% 94% 100% 95%   Weight:       Height:         Pt returned to  accompanied by Sheri SAENZ. No notable change in VS. Report given.

## 2023-02-09 NOTE — PHARMACY-VANCOMYCIN DOSING SERVICE
Pharmacy Vancomycin Initial Note  Date of Service 2023  Patient's  1975  47 year old, male  Indication: Sepsis  Current estimated CrCl = Estimated Creatinine Clearance: 96.8 mL/min (A) (based on SCr of 1.18 mg/dL (H)).  Creatinine for last 3 days  2023:  9:50 PM Creatinine 0.90 mg/dL  2023:  4:05 AM Creatinine 1.18 mg/dL;  1:52 PM Creatinine 1.18 mg/dL  Recent Vancomycin Level(s) for last 3 days  No results found for requested labs within last 72 hours.         Nephrotoxins and other renal medications (From now, onward)    Start     Dose/Rate Route Frequency Ordered Stop    23 0502  piperacillin-tazobactam (ZOSYN) 3.375 g vial to attach to  mL bag        Note to Pharmacy: Extended infusion dosing to start 6 hours after initial infusion.   See Hyperspace for full Linked Orders Report.    3.375 g  over 240 Minutes Intravenous EVERY 8 HOURS 23 0000  vancomycin (VANCOCIN) 2,500 mg in sodium chloride 0.9 % 500 mL intermittent infusion         25 mg/kg × 102.3 kg  over 2 Hours Intravenous ONCE 23 2330  piperacillin-tazobactam (ZOSYN) 3.375 g vial to attach to  mL bag        See Hyperspace for full Linked Orders Report.    3.375 g  over 30 Minutes Intravenous ONCE 23 0900  norepinephrine (LEVOPHED) 16 mg in sodium chloride 0.9 % 250 mL infusion CENTRAL         0.01-0.6 mcg/kg/min × 95.3 kg  0.9-53.6 mL/hr  Intravenous CONTINUOUS 23 0856      23 0630  vasopressin (VASOSTRICT) 20 Units in sodium chloride 0.9 % 100 mL standard conc infusion         2.4 Units/hr  12 mL/hr  Intravenous CONTINUOUS 23 0557            Contrast Orders - past 72 hours (72h ago, onward)    Start     Dose/Rate Route Frequency Stop    23 2130  iohexol (OMNIPAQUE) 350 MG/ML injectable solution 200 mL         200 mL Intravenous ONCE 238    23 0030  iopamidol (ISOVUE-370) solution 100 mL          100 mL Intravenous ONCE 02/08/23 0219                  Plan:  1. Start vancomycin  2500 mg IV x 1 then Vancomycin monitoring method: per TR  Levels while on CRRT,   2. Vancomycin therapeutic monitoring goal:15-20 mg/dl   3. Pharmacy will check vancomycin levels as appropriate in 1-3 Days when off CRRT  4. Serum creatinine levels will be ordered as appropriate.      Jayne Garner, [facility]

## 2023-02-09 NOTE — ANESTHESIA PROCEDURE NOTES
Arterial Line Procedure Note    Pre-Procedure   Staff -        Anesthesiologist:  Anthony Mcneal MD       Performed By: anesthesiologist       Location: ICU       Pre-Anesthestic Checklist: patient identified, IV checked, risks and benefits discussed, informed consent, monitors and equipment checked, pre-op evaluation and at physician/surgeon's request  Timeout:       Correct Patient: Yes        Correct Procedure: Yes        Correct Site: Yes        Correct Position: Yes   Line Placement:   This line was placed Pre Induction starting at 2/8/2023 6:00 PM and ending at 2/8/2023 6:25 PM  Procedure   Procedure: arterial line, new line and elective       Laterality: right       Insertion Site: radial.  Sterile Prep        Standard elements of sterile barrier followed       Skin prep: Chloraprep  Insertion/Injection        Technique: Seldinger Technique and ultrasound guided        1. Ultrasound was used to evaluate the access site.       2. Artery evaluated via ultrasound for patency/adequacy.       3. Using real-time ultrasound the needle/catheter was observed entering the artery/vein.       4. Permanent image was captured and entered into the patient's record.       5. The visualized structures were anatomically normal.       6. There were no apparent abnormal pathologic findings.       Catheter Type/Size: 20 G, 12 cm  Narrative         Secured by: suture       Tegaderm and Biopatch dressing used.       Complications: None apparent,        Arterial waveform: Yes

## 2023-02-09 NOTE — PROGRESS NOTES
Care Management Follow Up    Length of Stay (days): 1    Expected Discharge Date: 02/12/2023     Concerns to be Addressed: Vent #2      Patient plan of care discussed at interdisciplinary rounds: Yes    Anticipated Discharge Disposition: TBD    Anticipated Discharge Services:  TBD    Education Provided on the Discharge Plan:  Per Care Team  Patient/Family in Agreement with the Plan:  Yes    Referrals Placed by CM/SW:    Private pay costs discussed: Not applicable    Additional Information:  Chart reviewed.      Background:  Patient intubated and sedated. Assessment completed with patient's spouse at bedside. Patient and spouse live in their house. They have two adult daughter (22 and 24). Patient is independent with ADLs and IADLs. He does not drive, ambulates without devices and is unemployed. Patient had recent inpatient mental health stay in October. Spouse reports patient did not follow up with CD and mental health support recommendations.      Final discharge plan pending. Anticipating need for SWCM follow up.       CM updates:  Vent day #2  Mult pressors  Receiving blood products      CM will continue to follow plan of care, review recommendations,and assist with any discharge needs anticipated.       Brittanie Hartley RN
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
## 2023-02-09 NOTE — DISCHARGE INSTRUCTIONS
Transjugular Intrahepatic Portosystemic Shunt (TIPS) Placement:    You had Transjugular Intrahepatic Portosystemic Shunt (TIPS) procedure during your hospitalization. This is a procedure that helps reduce portal vein pressure for treatment of symptoms associated with portal hypertension and/or liver disease.    Care Instructions:    - Do not lift greater than 10 pounds for 2 days.  - May remove bandage/dressing and shower beginning the day after your procedure.    - Do not submerge neck access site underneath water in a tub, Jacuzzi or pool for 3 days or until the site is well healed.    - Do not apply any creams, ointments or lotions over the neck access site.   - Observe site for infection (redness, purulent drainage, increasing pain, fever).    Follow up:    - Our [facility] office will call you to schedule a post TIPS ultrasound and outpatient clinic follow up.This usually occurs within 1 month following the procedure. Please call sooner if any TIPS related questions or concerns ([phone]).    Please seek medical evaluation for:   - Fever (greater than 101 F (38.3C)).  - Purulent (yellow/green/ foul smelling) drainage from neck access site.  - Increasing pain/redness/swelling/drainage at neck access site.  - If you develop worsening confusion or increasing abdominal distention.  - If you have uncontrolled bleeding from the neck access site.  - If you begin coughing up or vomiting blood or if you have blood in your stool.

## 2023-02-09 NOTE — PROGRESS NOTES
Notified intensivist of critical lab values and being maxxed out on pressors, asked if he wanted another one. He said he would speak to family first and order a few more labs then decide what to do next. Calcium, bicarb, vent changes, and labs done. Waiting on results.

## 2023-02-09 NOTE — CONSULTS
Interventional Radiology - Progress Note  Inpatient - [facility]: Interventional Radiology   [phone]  02/09/2023     S:  Consult follow up - S/p emergent TIPS procedure yesterday evening. Continues to be intubated. Hgb continues to trend downward. Continues to be on multiple pressors. CRRT started overnight. S/p paracentesis yesterday. Blood being suctioned from mouth.      O:  BP (!) 147/63   Pulse (!) 136   Temp 97.3  F (36.3  C)   Resp (!) 8   Ht 1.829 m (6')   Wt 102.3 kg (225 lb 9.6 oz)   SpO2 94%   BMI 30.60 kg/m    General:  Stable.  In no acute distress.    Neuro:  Sedated.  Resp:  Intubated/vented  Abdomen:  Abdomen distended and firm.  Right neck procedure site CDI.    IMAGING:  IR TIPS 2/8/23:  PROCEDURE/TECHNIQUE:    Using real-time sonographic guidance, access was achieved into the right internal jugular vein and conversion was made for a 10 [place] vascular sheath. A catheter and wire were manipulated into the distal right hepatic vein, and digital subtraction   venography was obtained. Over wire exchange was made for a balloon occlusion catheter through which digital subtraction CO2 portal venography was obtained.     Over wire exchange was made for a Rosch-Uchida TIPS set with the sheath advanced into the mid right hepatic vein. The TIPS cannula was directed anteriorly and 3 transhepatic passes were performed gaining access into the right portal vein. A stiff wire   was manipulated through the Rosch-Uchida catheter and over wire exchange was made for a marker pigtail catheter which was positioned in the main portal vein. Digital subtraction portal venography was obtained. Intravenous pressure measurements were   obtained within the main portal vein and right atrium, and a portosystemic gradient was calculated.     Based on the pressure gradient findings, the transhepatic tract was predilated over an Amplatz wire with an 8 mm x 4 cm balloon. The outer sheath was advanced over  the wire through the dilated pathway and into the main portal vein. A Viatorr stent (7 cm   covered by 2 cm uncovered) was placed through the sheath, and the sheath was withdrawn into the right atrium. The stent was deployed and angioplastied to 8 mm along its entire length. Over wire exchange was made for the marking pigtail catheter, and   completion digital subtraction portal venography was obtained. Completion pressure measurements were obtained within the main portal vein and right atrium, and the portosystemic gradient was calculated.     FINDINGS:  Ultrasound demonstrates an anechoic and compressible right internal jugular vein.     The digital subtraction right hepatic venography shows the catheter to lie within the periphery of the right hepatic vein. The CO2 venography shows a normal caliber of portal vein with antegrade flow. Images obtained following transhepatic portosystemic   access show access has been achieved into the right portal vein approximately 2.6 cm distal to the portal venous confluence.     Following angioplasty and placement of the TIPS the stent is shown to be widely patent.      Pre-TIPS portosystemic gradient: 24 mmHg (RA 2, PV 26)     Post TIPS portosystemic gradient: 4 mmHg (RA 7, PV 11)                                                                      IMPRESSION:    1.  Successful placement of a TIPS extending from the right hepatic vein to the right portal vein, as detailed above.  2.  Significant improvement of the portosystemic gradient, measuring 24 mmHg preintervention and 4 mmHg post intervention.   3.  There is no filling of esophageal varices on post TIPS portal venography.    LABS:  Recent Labs   Lab 02/09/23  0510 02/09/23  0350 02/09/23  0044 02/08/23  2201 02/08/23  2151 02/08/23  1645 02/08/23  1352 02/08/23  0559 02/08/23  0405 02/07/23  2150   WBC  --  18.3*  --   --  15.1*  --   --   --  11.9* 10.6   HGB 7.7* 9.2* 8.7*  --  9.8* 6.9* 6.6*   < > 6.9* 8.7*   PLT  --   46*  --   --  53* 60*  --   --  78* 51*   INR 7.21*  --   --  5.18*  --   --   --   --   --  2.31*   CR 2.35* 2.38*  --   --  2.36*  --  1.18*  --  1.18* 0.90    < > = values in this interval not displayed.      Liver Function Studies - Recent Labs   Lab Test 02/09/23  0510 02/09/23  0350 02/08/23  2151   PROTTOTAL 3.7* 3.8* 3.2*   ALBUMIN 2.5* 2.3* 1.6*   BILITOTAL 5.9* 6.4* 4.8*   ALKPHOS 291* 284* 115   AST 13,881* 14,410* 6,778*   ALT 1,321* 1,455* 645*      MELD-Na score: 44 at 2/9/2023  5:10 AM  MELD score: 44 at 2/9/2023  5:10 AM  Calculated from:  Serum Creatinine: 2.35 mg/dL at 2/9/2023  5:10 AM  Serum Sodium: 151 mmol/L (Using max of 137 mmol/L) at 2/9/2023  5:10 AM  Total Bilirubin: 5.9 mg/dL at 2/9/2023  5:10 AM  INR(ratio): 7.21 at 2/9/2023  5:10 AM  Age: 47 years    A:  47 year old yo male with Etoh hepatitis / probable cirrhosis complicated by portal HTN with varices, ascites and Esophageal variceal bleed, s/p EGD with banding early AM of 2/8/23. S/p TIPS late evening 2/8/23 for ongoing bleeding.    MELD 44. Critically ill.    P:    - Hgb continues to drift down, receiving blood. Continue supportive cares.  - Could consider IR variceal embolization if this were to algin with patient's goals of care and there continues to be concerns of ongoing bleeding.  - Continue to follow Hgb and MELD labs.   - Lactose per GI.   - Patient continues to be critically ill, pending course, would typically recommend follow up in 1 month in IR clinic with post TIPS ultrasound.   - Discussed with radiologist Dr. Heart.      Total time spent on the date of the encounter is 30 minutes, including time spent counseling the patient, performing a medically appropriate evaluation, reviewing prior medical history, ordering medications and tests, documenting clinical information in the medical record, and communication of results.    Gilma Brooke PA-C  Interventional Radiology  [phone]    E/M codes for reference  only:  99340

## 2023-02-09 NOTE — ANESTHESIA PROCEDURE NOTES
Arterial Line Procedure Note    Pre-Procedure   Staff -        Anesthesiologist:  Anthony Mcneal MD       Performed By: anesthesiologist       Location: ICU       Pre-Anesthestic Checklist: patient identified, IV checked, risks and benefits discussed, informed consent, monitors and equipment checked, pre-op evaluation and at physician/surgeon's request  Timeout:       Correct Patient: Yes        Correct Procedure: Yes        Correct Site: Yes        Correct Position: Yes   Line Placement:   This line was placed Pre Induction starting at 2/8/2023 6:25 PM and ending at 2/8/2023 6:40 PM  Procedure   Procedure: arterial line, new line and elective       Laterality: right       Insertion Site: brachial.  Sterile Prep        Standard elements of sterile barrier followed       Skin prep: Chloraprep  Insertion/Injection        Technique: Seldinger Technique and ultrasound guided        1. Ultrasound was used to evaluate the access site.       2. Artery evaluated via ultrasound for patency/adequacy.       3. Using real-time ultrasound the needle/catheter was observed entering the artery/vein.       4. Permanent image was captured and entered into the patient's record.       5. The visualized structures were anatomically normal.       6. There were no apparent abnormal pathologic findings.       Catheter Type/Size: 20 G, 12 cm  Narrative         Secured by: suture       Tegaderm and Biopatch dressing used.       Complications: None apparent,        Arterial waveform: Yes    Comments:  Right radial showing significant lower SBP as compared to non-invasive BP cuff on left arm.  Decision to place brachial arterial line.

## 2023-02-09 NOTE — PROGRESS NOTES
SPIRITUAL HEALTH SERVICES NOTE  [facility]; ICU    SPIRITUAL ASSESSMENT  Not assessed    PLAN OF CARE: Will make additional visits as requested by patient, family, or staff.     FULL SPIRITUAL CARE NOTE:   Notified by Osmani's nurse Kindra that Osmani's mother would like a  to pray with Osmani. Osmani was sedated at the time of my visit and no family was present. Said a prayer at his bedside.     Time Spent with Patient/Family: 5 minutes    Marcella Ro M.Div.      Office: [phone] (for non-urgent requests)  Please Vocera or page through [facility] for time-sensitive requests

## 2023-02-09 NOTE — PROGRESS NOTES
ICU NOTE    Patient underwent TIPS procedure.   According to report :    Pre TIPS HVPG 24 mmHg   Post TIPS HPVG 4 mmHg  There is no filling of esophageal varices on post TIPS images.     Patient is vented , on 4 pressors to keep MAP > 60.     Follow up labs showed severe metabolic acidosis , shock liver, low fibrinogen , elevated BUN/Cr, hypocalcemia and high lactic acid.     Plan     1. Titrate vent settings A/C 32/480/5/40%, titrate FiO2 to keep SpO2 > 90%, palteau pressure < 30  2. Serial ABGs  3. Sedation to keep RASS -3 , propofol, fentanyl drip  4. Start high dose bicarb drip   5. Pressors as needed to keep MAP > 60, currently on NE, EPI, vasopressin and phenylephrine  6. Start stress dose steroids   7. Serial H/H , RBC transfusion as needed  8. Cryoprecipitate   9. Continue PPI and octreotide drip   10. Broad Abx to zosyn and vancomycin   11. Supplement electrolytes  12. Nephrology consult , plan for CRRT  13. NPO  14. Glucose level monitoring   15. Thiamine IV  16. DVT prophylaxis SCDs    ICU addition time : 30 minutes , not including procedures.     Roberto Mccoy MD  Pulmonary Critical Care Medicine   02/08/23   11:00 PM

## 2023-02-09 NOTE — PROGRESS NOTES
RT PROGRESS NOTE    VENT DAY# 2    CURRENT SETTINGS:   Vent Mode: CMV/AC  (Continuous Mandatory Ventilation/ Assist Control)  FiO2 (%): (S) 90 % (MD notified)  Resp Rate (Set): 32 breaths/min  Tidal Volume (Set, mL): (S) 540 mL  PEEP (cm H2O): 5 cmH2O  Resp: (!) 32      PATIENT PARAMETERS:  PIP 32  Pplat:  22  Pmean:  14  Compliance: 23  Secretions:  ETT scant white, Oral lauro blood  02 Sats:  95%  BS: clear bilaterally     ETT SIZE 7.5 Secured at 24 cm at teeth/gums  Emergency Ambu bag, mask and peep valve at bedside.         NOTE / SHIFT SUMMARY:       VT increased from 480ml to 540ml    Fi02 increased from 80 to 90%    I time decreased t0 0.75    Tube securement replaced due to facial swelling and soiled ties.  Large amount of lauro blood suctioned orally. Weaning trial not completed today.  Skin checked and tube moved Q2, this responsibility is shared between RN and RT. RT to continue to assess and monitor cardiopulmonary status.        Valencia Thomson, RT

## 2023-02-09 NOTE — PLAN OF CARE
[facility] - ICU    RN Progress Note:            Pertinent Assessments:      Please refer to flowsheet rows for full assessment     Sedated, unresponsive. Pupils brisk, 3.    Sinus tach, 130s. Weak pulses. Pressors. Suctioning blood from mouth, clots present, rhino rocket in bilateral nostrils.    Clear lung sounds. 80% 5PEEP.     Absent bowel sounds. No BM. Abdomen distended and firm.    Very low urine output. Dark durga.         Mobility Level:     1 - Breathe      Barriers to Progression: 5 pressors, vent, extremely critical status.         Key Events - This Shift:     Tips procedure right at the start of my shift, which went well. Pt received 2U PRBC and 2U FFP. VSS during procedure. Came back up around 2115, BP started to drop. Significant difference between art line pressure and cuff pressure, instructed to go off of art line (which was lower). Eventually started 5th pressor.    Labs were critical all night, Dr. Danielle was notified of each one. Interventions were done per his discretion.    Pressors changed to higher concentration bags and bicarb was changed to a higher concentration.    Had to increase FiO2 from 50-80 throughout the night.    Dr. Danielle had discussions with family. I also spoke with family and answered their questions.    Pt began to overbreathe vent and bite tube around 0300 so intensivist notified and sedation added.     D10 added for low BG.    CRRT started at 0040, pull is net 0. Multiple doses of albumin given once CRRT was started, pressors were maxxed at this time. Had to decrease BFR temporarily until BP recovered.     By end of my shift infusions: fent at 75, precedex at 0.6, Epi at 0.3, levo at 0.6, lina at 6, vaso at 2.4, angiotensin II at 40, bicarb at 75, octreotide at 50, protonix at 8, D10 at 25.           Barriers to Discharge / Downgrade:     Extremely critical with multiple pressors, vent, sedation.         Point of Contact Update YES-OR-NO:  Yes    Name: Amy  Phone Number: Spoke in person  Summary of Conversation: Explained how critically ill the patient was and the possibility of death overnight. She was very heartbroken but understood the severity of his illness. I explained that she, her daughters and her in-laws were approved to stay overnight if they wished. They ultimately decided to go home and want us to continue to do everything we can to save their loved one. They would like to receive notifications if things take a turn for the worse.         Problem: CRRT (Continuous Renal Replacement Therapy)  Goal: Hemodynamic Stability  Outcome: Progressing     Problem: CRRT (Continuous Renal Replacement Therapy)  Goal: Body Temperature Maintained in Desired Range  Outcome: Progressing     Problem: Mechanical Ventilation Invasive  Goal: Mechanical Ventilation Liberation  Outcome: Progressing  Intervention: Promote Extubation and Mechanical Ventilation Liberation     Problem: Alcohol Withdrawal  Goal: Optimal Neurologic Function  Outcome: Progressing     Problem: Pain Chronic (Persistent) (Comorbidity Management)  Goal: Acceptable Pain Control and Functional Ability  Outcome: Progressing  Intervention: Manage Persistent Pain    Intervention: Optimize Psychosocial Wellbeing     Problem: Gastrointestinal Bleeding  Goal: Hemostasis  Outcome: Progressing

## 2023-02-09 NOTE — PROGRESS NOTES
[facility] DIGESTIVE HEALTH PROGRESS NOTE      Interim Events: Patient underwent TIPS last evening in light of ongoing bleeding (no change in hemoglobin despite multiple prbc's, maximum pressor support needed yesterday    Subjective:              Patient intubated, sedated    Objective:                  Vital signs in last 24 hrs;  Temp:  [94.8  F (34.9  C)-98  F (36.7  C)] 97.9  F (36.6  C)  Pulse:  [101-138] 110  Resp:  [0-32] 9  BP: ()/(40-72) 109/58  MAP:  [43 mmHg-101 mmHg] 72 mmHg  Arterial Line BP: ()/(32-61) 95/57  FiO2 (%):  [30 %-80 %] 80 %  SpO2:  [91 %-100 %] 95 %     Pressor support lessening    Physical Exam:   General: Intubated, sedated  Cardiovascular: Regular rate. No edema  Chest: CTA anterolaterally  Abdomen: soft, hypoactive BS, distended but not tense and improved since yesterday  Neurologic: intubated, sedated    Current Labs:  CBC RESULTS: Recent Labs   Lab Test 02/09/23  1153 02/09/23  0510 02/09/23  0350 02/09/23  0044 02/08/23  2151   WBC 12.2*  --  18.3*  --  15.1*   RBC 2.16*  --  2.99*  --  3.15*   HGB 6.7* 7.7* 9.2*   < > 9.8*   HCT 18.7*  --  27.1*  --  30.2*   MCV 87  --  91  --  96   MCH 31.0  --  30.8  --  31.1   MCHC 35.8  --  33.9  --  32.5   RDW 19.0*  --  20.7*  --  20.6*   PLT 26*  --  46*  --  53*    < > = values in this interval not displayed.        CMP Results:   Recent Labs   Lab Test 02/09/23  1155 02/09/23  1153 02/09/23  1117 02/09/23  0626 02/09/23  0510 02/09/23  0440 02/09/23  0350 02/09/23  0042 02/08/23  2151   NA  --  150*  --   --  151*  --  150*  --  146*   POTASSIUM  --  5.8*  --   --  4.8  --  4.7  --  4.9   CHLORIDE  --  103  --   --  102  --  102  --  100   CO2  --  20*  --   --  12*  --  12*  --  9*   ANIONGAP  --  27*  --   --  37*  --  36*  --  37*   GLC 82 93 133*   < > 110*   < > 69*   < > 138*  159*   BUN  --  8.1  --   --  8.7  --  9.0  --  10.1   CR  --  2.25*  --   --  2.35*  --  2.38*  --  2.36*   BILITOTAL  --   --   --   --  5.9*  --   6.4*  --  4.8*   ALKPHOS  --   --   --   --  291*  --  284*  --  115   ALT  --   --   --   --  1,321*  --  1,455*  --  645*   AST  --   --   --   --  13,881*  --  14,410*  --  6,778*    < > = values in this interval not displayed.        INR Results:   Recent Labs   Lab Test 02/09/23  0510 02/08/23  2201 02/07/23  2150   INR 7.21* 5.18* 2.31*            Assessment:          1. Etoh hepatitis / probable cirrhosis complicated by portal htn with varices, ascites  2. Esophageal variceal bleed, s/p EGD with banding around 4 am yesterday.  Remained on maximal pressor support and hemoglobin did not rise despite multiple packed red cell transfusions.  As such underwent emergent TIPS last evening  3. High AST, INR: concerning for ischemic hepatitis (shock liver, TIPS can sometimes also contribute), rising INR concerning for further hepatic dysfunction but some may be consumptive coagulopathy due to severe bleeding.  High CK may indicate rhabdo.  4. Large volume ascites: Likely exacerbated by needed fluid resuscitation.  Abdomen less tense than yesterday. No SBP     He remains critically ill.  MELD 40.  On less pressor support.  Abdomen quiet, distended but less firm than yesterday. Receiving CRRT.  Some of the lab derangements likely due to rhabdo but picture is very worrisome for progressive hepatis dysfunction / ischemic hepatitis.  Now requiring D20 per discussion with the Hospitalist.        Recommendations:  1. Ongoing supportive care  2. Continue octreotide  3. Continue ceftriaxone  4. Continue PPI  6. Continue to trend MELD labs  7. CIWA protocol  8. On CCRT  9. Appreciate assistance of Intensivist and Nephrology services  10. GI following closely.  Please call with change in status, questions or concerns             Jonathan Rudd MD  Thank you for the opportunity to participate in the care of this patient.   Please feel free to call me with any questions or concerns.  Phone number [phone].          2/9/2023  1:04 PM

## 2023-02-09 NOTE — PROCEDURES
CENTRAL LINE INSERTION PROCEDURE NOTE  (NON-OR)    Procedure Date: 2/8/2023   Performing Physician: Roberto Mccoy MD    Procedure:  Insertion of Central Venous Catheter  Right   FEMORAL    Indications:  ACUTE KIDNEY INJURY, SEVERE METABOLIC ACIDOSIS, HYPOVOLEMIC SHOCK and RESPIRATORY FAILURE       Estimated Blood Loss:  MINIMAL  ml  Complications: NONE     Findings:  Localization of right femoral vein under US, placement to hemodialysis catheter under US guidance.     Procedure Details:   Procedure was done as an emergency : Yes  The risks, benefits, complications, treatment options, and expected outcomes were discussed with WIFE .  The risks and potential complications of their problem and purposed procedure include but are not limited to infection, bleeding, pain,  lung puncture, the need for additional procedures, creating a complication requiring transfusion or operation, and nerve and vessel injury.  The patient/alternate (see above) concurred with the proposed plan, giving informed consent.  The site of the procedure was properly noted/marked. The patient was identified as Osmani Salazar with Date of Birth 1975 and the procedure verified as Insertion of Central Venous Catheter.  A Time Out was held and the above information confirmed.    Under sterile conditions the skin over the  Right   FEMORAL     was prepped with Chlorhexidine and covered with a sterile drape.  Strict sterile conditions were maintained,  Cap, mask, and sterile gloves were worn by all participants.  5 ml of Lidocaine 1% local anesthetic was infiltrated into the skin and subcutaneous tissues.  A 22-gauge needle was used to identify the vein.  Using Sledinger technique an 18-gauge needle was then inserted into the vein.  A guide wire was then passed easily through the catheter.  There were no arrythmias    .  The catheter was then withdrawn.  A 7.0 Maori double lumen   was then inserted into the vessel over the guide wire.   All ports were flushed with sterile solution and had good non-pulsatile blood return.  The catheter was sutured into place and an occlusive sterile dressing applied.  The patient tolerated the procedure well with no change in vital signs.     Number of attempts:  1        Condition: unstable      ________________________________________________________________________  Roberto Mccoy MD  2/8/2023 11:05 PM

## 2023-02-10 NOTE — PLAN OF CARE
[facility] - ICU    RN Progress Note:            Pertinent Assessments:      Please refer to flowsheet rows for full assessment     RASS of -4, pupils 3 brisk. Occasional muscle jerking, mostly floppy.    ST in the 130s most of the night, weak pulses. Edema throughout.    Clear lung sounds. 100% PEEP 10, on veletri. Large/moderate amount of bloody oral secretions, with clots.    Absent bowel sounds. Distended abdomen.    Oliguria, dark durga urine. CRRT running.         Mobility Level:     2 - Tilt      Barriers to Progression: critically ill on 3 pressors and 100% FiO2.         Key Events - This Shift:     Increasing levo overnight to keep MAP greater than 55 on art line. Lucas at max and vaso infusing. Albumin given x3.    FIO2 increased and PEEP increased overnight, veletri added.    Multiple electrolyte supplements given.     Pt went into SVT (up to 172) at 0600, intensivist notified. BP and SpO2 began to drop. Calcium chloride, mag sulfate, bicarb, and albumin given, HR now 149. Also BFR changed to 100 on CRRT.    2U of PRBCs given on my shift.    BGs have been within a normal range since D20 was changed to 100.    Gtts: D20 at 100, protonix at 8, octreotide at 50, fentanyl at 75, precedex at 0.6, lucas at 6, vaso at 2.4, levo at 0.55, bicarb at 25, and a TKO.    Intensivist notified of all critical lab values.            Barriers to Discharge / Downgrade:     LDAs, gtts, vent.       Problem: CRRT (Continuous Renal Replacement Therapy)  Goal: Body Temperature Maintained in Desired Range  Outcome: Progressing     Problem: CRRT (Continuous Renal Replacement Therapy)  Goal: Hemodynamic Stability  Outcome: Progressing     Problem: CRRT (Continuous Renal Replacement Therapy)  Goal: Safe, Effective Therapy Delivery  Outcome: Progressing     Problem: Mechanical Ventilation Invasive  Goal: Absence of Device-Related Skin and Tissue Injury  Outcome: Progressing     Problem: Mechanical Ventilation  Invasive  Goal: Mechanical Ventilation Liberation  Outcome: Progressing  Intervention: Promote Extubation and Mechanical Ventilation Liberation     Problem: Alcohol Withdrawal  Goal: Alcohol Withdrawal Symptom Control  Outcome: Progressing     Problem: Pain Chronic (Persistent) (Comorbidity Management)  Goal: Acceptable Pain Control and Functional Ability  Outcome: Progressing  Intervention: Manage Persistent Pain  Intervention: Optimize Psychosocial Wellbeing     Problem: Gastrointestinal Bleeding  Goal: Hemostasis  Outcome: Progressing

## 2023-02-10 NOTE — PROGRESS NOTES
Care Management Follow Up    Length of Stay (days): 2    Expected Discharge Date: 02/12/2023     Concerns to be Addressed:   Vent Day #3    Patient plan of care discussed at interdisciplinary rounds: Yes    Anticipated Discharge Disposition:  TBD     Anticipated Discharge Services:  TBD    Education Provided on the Discharge Plan: Per Care Team     Patient/Family in Agreement with the Plan:  Yes    Referrals Placed by CM/SW:    Private pay costs discussed: Not applicable    Additional Information:  Chart reviewed.     Background:  Patient intubated and sedated. Assessment completed with patient's spouse at bedside. Patient and spouse live in their house. They have two adult daughter (22 and 24). Patient is independent with ADLs and IADLs. He does not drive, ambulates without devices and is unemployed. Patient had recent inpatient mental health stay in October. Spouse reports patient did not follow up with CD and mental health support recommendations.      Final discharge plan pending. Anticipating need for SWCM follow up.      CM updates:  Vent day #3  Mult pressors  Receiving blood products   Hypotension   Low temps  NPO  GI/Neph following  Per provider during rounds pt is not doing well with liver function, he will be discussing GOC with family today, and directed bedside to call the family.          CM will continue to follow plan of care, review recommendations,and assist with any discharge needs anticipated.          Brittanie Hartley RN

## 2023-02-10 NOTE — PROVIDER NOTIFICATION
02/10/23 0108   Ventilator - Patient    Patient Resp Rate  28 breaths/min   Expiratory Vt (ml) 535   Minute Volume (ml) 13.8 L/min   Peak Inspiratory Pressure (cm H2O) 30 cmH2O   Mean Airway Pressure (cm H2O) 12 cmH2O   Plateau Pressure (cm H2O) 20 cmH2O   Dynamic Compliance (mL/cm H2O) 38 mL/cm H2O   Airway Resistance 17.7   Auto/ Intrinsic PEEP (cm H2O) 1.7 cmH2O

## 2023-02-10 NOTE — PLAN OF CARE
Problem: Oral Intake Inadequate  Goal: Improved Oral Intake  2/10/2023 1123 by Rhoda Camarena, JENNIFER  Outcome: Unable to Meet  2/10/2023 1122 by Rhoda Camarena RD  Outcome: Not Progressing   Goal Outcome Evaluation:       Diet: NPO  Mechanical ventilation, CRRT  No plan to feed today.  Per Rounds, poor prognosis.

## 2023-02-10 NOTE — PROGRESS NOTES
Asked to pronounce patient's death.  He was compassionately extubated at 12:35.    No cardiac or respiratory activity  Mr. Salazar was pronounced dead on 2/10/2023 at 12:45

## 2023-02-10 NOTE — PROGRESS NOTES
Vent Mode: CMV/AC  (Continuous Mandatory Ventilation/ Assist Control)  FiO2 (%): 100 %  Resp Rate (Set): 27 breaths/min  Tidal Volume (Set, mL): 540 mL  PEEP (cm H2O): 10 cmH2O  Resp: 27      VENT DAY: 3    PIP: 30  Mean: 15  Plat: 22    ETT: 7.5 secured at 24 at the teeth    Meds: Veletri, Duoneb QID    AB - 7.42/35/57/22    NOTE: pt remains on mechanical ventilation.  FiO2 has increased to 100%, PEEP increased to 10.  BS clear/dim.  Suctioned once no secretions through ETT.  Bloody secretions orally.  Veletri started and duonebs added to begin at 0800.

## 2023-02-10 NOTE — PROVIDER NOTIFICATION
02/09/23 2051   Ventilator - Patient    Patient Resp Rate  32 breaths/min   Expiratory Vt (ml) 542   Minute Volume (ml) 16.2 L/min   Peak Inspiratory Pressure (cm H2O) 30 cmH2O   Mean Airway Pressure (cm H2O) 13 cmH2O   Plateau Pressure (cm H2O) 21 cmH2O   Dynamic Compliance (mL/cm H2O) 37 mL/cm H2O   Airway Resistance 17   Auto/ Intrinsic PEEP (cm H2O) 2 cmH2O

## 2023-02-10 NOTE — SIGNIFICANT EVENT
CRRT stopped at 1230 per family wishes. Patient extubated at 1235, medication infusions stopped at that time. Patient's daughter at bedside.

## 2023-02-10 NOTE — PROGRESS NOTES
SPIRITUAL HEALTH SERVICES Progress Note      Saw Osmani's family per staff referral with Osmani nearing end of life. Brief visit with Osmani's wife, mother, and father in [facility]. Mother had asked for a prayer from  yesterday and declined bedside prayer this morning. They are tearful and grieving appropriately. They want some time as a family; they are aware of  support if desired.     Kadeem Perdomo MDiv, [facility]  /Manager Spiritual Health Services  [phone]

## 2023-02-10 NOTE — SIGNIFICANT EVENT
Pt's blood pressure 70s/30s, max doses of norepinephrine, phenylephrine, vasopressin. Dr. Delatorre updated, CRRT pt fluid removal decreased to 0 ml/hr. Family updated at bedside.

## 2023-02-10 NOTE — DISCHARGE SUMMARY
[facility] Discharge Summary    Osmani Salazar MRN# [id number]   Age: 47 year old YOB: 1975     Date of Admission:  2/7/2023  Date of Discharge::  2/102023  Admitting Physician:  Roberto Mccoy MD  Discharge Physician:  Ritchie Delatorre MD  Primary Physician:                 Anthony Brady                 Admission Diagnoses:   Upper GI bleed due to esophageal varices  Alcoholic liver cirrhosis  Portal hypertension  Ascites  Alcohol abuse  Acute blood loss anemia  Acute respiratory failure          Discharge Diagnosis:   Acute hypoxic respiratory failure  Hemorrhagic shock  Acute liver failure  Coagulopathy secondary to above  Acute renal failure  Lactic acidosis  Acute blood loss anemia  Rhabdomyolysis  Hypoglycemia  Portal hypertension status post TIPS  Esopharyngeal variceal bleeding  Liver cirrhosis secondary to alcohol abuse          Hospital Course:     47-year-old male admitted here on 2/8 with hematemesis.  He was intubated and underwent upper endoscopy with banding of esophageal varices.  Standard treatment with PPI, octreotide drip and ceftriaxone.  Underwent paracentesis.  Multiple blood transfusion including PRBCs, fresh frozen plasma and platelets.  Due to persistent bleeding he underwent emergent TIPS placement the same day.  Due to refractory lactic acidosis he was initiated on CRRT and required multiple vasopressors including epinephrine, norepinephrine, Lucas-Synephrine, vasopressin and angiotensin II.  Unfortunately he developed acute liver failure as manifested as worsening coagulopathy requiring Fresh frozen plasma and cryoprecipitate transfusion and refractory hypoglycemia.  Initially his pH and pressor needs improved but in the evening of 2/9 he started getting to get worse.  Overnight his respiratory status worsened and had to be started on inhaled epoprostenol.    I met with the family on 2/10 and after explaining the fact that his condition  at that point was a reversible they decided to proceed with withdrawal of care.  CRRT was stopped, vasopressors were stopped and he was compassionately extubated    He passed away on 2/10/2023 at 12:45    Total time spent with the patient on 2/10 including management of the ventilator, vasopressors, refractory acidosis and hypotension, multiple meetings with family amounted to 100 minutes.    Ritchie Delatorre MD

## 2023-02-13 LAB
BACTERIA BLD CULT: NO GROWTH
BACTERIA BLD CULT: NO GROWTH
BACTERIA FLD CULT: NO GROWTH
GRAM STAIN RESULT: NORMAL
GRAM STAIN RESULT: NORMAL

## 2024-09-05 NOTE — PLAN OF CARE
Osmani Salazar  October 13, 2022  Plan of Care Hand-off Note     Patient Care Path: Inpatient Mental Health    Plan for Care:     It is the recommendation of this clinician that pt admit to IP MH for safety and stabilization. Pt displays the following risk factors that support IP admission: specific plan to jump off a bridge when discharged. Pt is unable to engage in safety planning to mitigate risk level in a non-secure setting. Lower levels of care would not be sufficient in managing the level of risk pt is presenting with. Due to this IP is the least restrictive option of care for pt. Pt should remain in IP until deemed safe to return to the community and engage in OP  supports. Pt will need assistance establishing OP MH services prior to discharge.  Admission to Inpatient Level of Care is indicated due to:     1. Patient risk of severity of behavioral health disorder is appropriate to proposed level of care as indicated by:               Imminent Risk of Harm: Current plan for suicide or serious harm to self is present  And/or:  Behavioral health disorder is present and appropriate for inpatient care with both of the following:     Severe psychiatric, behavioral or other comorbid conditions are appropriate for management at inpatient mental health as indicated by at least one of the following:   ? Comorbid substance use disorder and Impaired impulse control, judgement, or insight    Severe dysfunction in daily living is present as indicated by at least one of the following:   ? Extreme deterioration in social interactions     2. Inpatient mental health services are necessary to meet patient needs and at least one of the following:  Specific condition related to admission diagnosis is present and judged likely to deteriorate in absence of treatment at proposed level of care     3. Situation and expectations are appropriate for inpatient care, as indicated by one of the following:   Voluntary treatment at [facility]  level of care is not feasible       Critical Safety Issues: Pt is determined to complete suicide once discharged. Pt does not feel people are listening to him and ignoring this determination. Pt report 0 motivation to change this plan.    Overview:  This patient is a child/adolescent: No    This patient has additional special visitor precautions: No    Legal Status: Voluntary, but holdable due to being suicidal with specific plan and high motivation to complete    Contacts:   [facility] ED, Dr Dai: Contact [phone]    Psychiatry Consult:  Not at this time    Updated Attending Provider and Chanelle and Central Intake  regarding plan of care.    Brittanie Brandt, [facility] HospitalSW, LADC       long term resident

## (undated) DEVICE — TUBING ENDOGATOR + H2O PORT CO

## (undated) DEVICE — KIT CONNECTOR FOR OLYMPUS ENDOSCOPES DEFENDO 100310

## (undated) DEVICE — CANNULA NASAL COMFORT SOFT 25FT 0537

## (undated) DEVICE — KIT SCOPE CLEANING ENDOSCOPY BX00719705

## (undated) DEVICE — TUBING SUCTION MEDI-VAC 1/4"X20' N620A - HE

## (undated) DEVICE — CATH SUCTION 14FR W/O CTRL DYND41962

## (undated) DEVICE — KIT IV START DYNDV1431

## (undated) DEVICE — SUCTION CANISTER 1000ML 65651-510

## (undated) DEVICE — BITE BLOCK SCOPE DISP 20 X 27 000429

## (undated) DEVICE — ENDO LIGATOR UNIV 6 BAND G31917 MBL-U-6

## (undated) DEVICE — CONNECTOR ONE-LINK INJECTION SITE LF 7N8399

## (undated) DEVICE — SYR 03ML LL W/O NDL 309657

## (undated) DEVICE — SOL WATER IRRIG 500ML BOTTLE 2F7113

## (undated) DEVICE — SWABCAP DISINFECTANT GREEN CFF10-250

## (undated) DEVICE — PLATE GROUNDING ADULT W/CORD 9165L